# Patient Record
Sex: MALE | Race: WHITE | HISPANIC OR LATINO | Employment: FULL TIME | URBAN - METROPOLITAN AREA
[De-identification: names, ages, dates, MRNs, and addresses within clinical notes are randomized per-mention and may not be internally consistent; named-entity substitution may affect disease eponyms.]

---

## 2017-02-23 ENCOUNTER — HOSPITAL ENCOUNTER (EMERGENCY)
Facility: OTHER | Age: 32
Discharge: HOME | End: 2017-02-23
Attending: EMERGENCY MEDICINE
Payer: COMMERCIAL

## 2017-02-23 VITALS
BODY MASS INDEX: 26.6 KG/M2 | OXYGEN SATURATION: 100 % | DIASTOLIC BLOOD PRESSURE: 71 MMHG | HEIGHT: 71 IN | TEMPERATURE: 98 F | SYSTOLIC BLOOD PRESSURE: 110 MMHG | HEART RATE: 65 BPM | WEIGHT: 190 LBS | RESPIRATION RATE: 18 BRPM

## 2017-02-23 DIAGNOSIS — S00.01XA ABRASION OF SCALP, INITIAL ENCOUNTER: ICD-10-CM

## 2017-02-23 DIAGNOSIS — S00.03XA HEMATOMA OF SCALP, INITIAL ENCOUNTER: ICD-10-CM

## 2017-02-23 DIAGNOSIS — R56.9 SEIZURE: Primary | ICD-10-CM

## 2017-02-23 DIAGNOSIS — R78.89 DILANTIN LEVEL TOO LOW: ICD-10-CM
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
## 2017-02-23 LAB
ANION GAP SERPL CALC-SCNC: 7 MMOL/L
BASOPHILS # BLD AUTO: 0.02 K/UL
BASOPHILS NFR BLD: 0.2 %
BUN SERPL-MCNC: 10 MG/DL
CALCIUM SERPL-MCNC: 9.1 MG/DL
CHLORIDE SERPL-SCNC: 104 MMOL/L
CO2 SERPL-SCNC: 28 MMOL/L
CREAT SERPL-MCNC: 0.8 MG/DL
DIFFERENTIAL METHOD: ABNORMAL
EOSINOPHIL # BLD AUTO: 0.2 K/UL
EOSINOPHIL NFR BLD: 1.4 %
ERYTHROCYTE [DISTWIDTH] IN BLOOD BY AUTOMATED COUNT: 12.5 %
EST. GFR  (AFRICAN AMERICAN): >60 ML/MIN/1.73 M^2
EST. GFR  (NON AFRICAN AMERICAN): >60 ML/MIN/1.73 M^2
GLUCOSE SERPL-MCNC: 99 MG/DL
HCT VFR BLD AUTO: 47 %
HGB BLD-MCNC: 16.5 G/DL
LYMPHOCYTES # BLD AUTO: 1.5 K/UL
LYMPHOCYTES NFR BLD: 14.1 %
MAGNESIUM SERPL-MCNC: 2.3 MG/DL
MCH RBC QN AUTO: 31.4 PG
MCHC RBC AUTO-ENTMCNC: 35.1 %
MCV RBC AUTO: 89 FL
MONOCYTES # BLD AUTO: 0.8 K/UL
MONOCYTES NFR BLD: 8 %
NEUTROPHILS # BLD AUTO: 7.9 K/UL
NEUTROPHILS NFR BLD: 76 %
PHENYTOIN SERPL-MCNC: 4 UG/ML
PLATELET # BLD AUTO: 170 K/UL
PMV BLD AUTO: 11 FL
POTASSIUM SERPL-SCNC: 4.1 MMOL/L
RBC # BLD AUTO: 5.26 M/UL
SODIUM SERPL-SCNC: 139 MMOL/L
WBC # BLD AUTO: 10.41 K/UL

## 2017-02-23 PROCEDURE — 90715 TDAP VACCINE 7 YRS/> IM: CPT | Performed by: EMERGENCY MEDICINE

## 2017-02-23 PROCEDURE — 25000003 PHARM REV CODE 250: Performed by: EMERGENCY MEDICINE

## 2017-02-23 PROCEDURE — 63600175 PHARM REV CODE 636 W HCPCS: Performed by: EMERGENCY MEDICINE

## 2017-02-23 PROCEDURE — 99284 EMERGENCY DEPT VISIT MOD MDM: CPT

## 2017-02-23 PROCEDURE — 90471 IMMUNIZATION ADMIN: CPT | Performed by: EMERGENCY MEDICINE

## 2017-02-23 RX ORDER — LEVETIRACETAM 750 MG/1
750 TABLET ORAL 2 TIMES DAILY
COMMUNITY
End: 2017-02-23

## 2017-02-23 RX ORDER — PHENYTOIN SODIUM 100 MG/1
400 CAPSULE, EXTENDED RELEASE ORAL
Status: COMPLETED | OUTPATIENT
Start: 2017-02-23 | End: 2017-02-23

## 2017-02-23 RX ORDER — LAMOTRIGINE 100 MG/1
100 TABLET ORAL 2 TIMES DAILY
Qty: 30 TABLET | Refills: 0 | Status: SHIPPED | OUTPATIENT
Start: 2017-02-23 | End: 2017-02-23

## 2017-02-23 RX ORDER — LACOSAMIDE 100 MG/1
100 TABLET ORAL 2 TIMES DAILY
Qty: 30 TABLET | Refills: 3 | Status: SHIPPED | OUTPATIENT
Start: 2017-02-23 | End: 2017-02-23

## 2017-02-23 RX ORDER — LAMOTRIGINE 25 MG/1
25 TABLET ORAL EVERY 12 HOURS
COMMUNITY
End: 2017-02-23

## 2017-02-23 RX ORDER — PHENYTOIN SODIUM 100 MG/1
100 CAPSULE, EXTENDED RELEASE ORAL 4 TIMES DAILY
COMMUNITY
End: 2017-02-23

## 2017-02-23 RX ORDER — LACOSAMIDE 100 MG/1
100 TABLET ORAL 2 TIMES DAILY
Qty: 30 TABLET | Refills: 0 | Status: ON HOLD | OUTPATIENT
Start: 2017-02-23 | End: 2017-05-12 | Stop reason: SDUPTHER

## 2017-02-23 RX ORDER — PHENYTOIN SODIUM 100 MG/1
100 CAPSULE, EXTENDED RELEASE ORAL 4 TIMES DAILY
Qty: 90 CAPSULE | Refills: 0 | Status: ON HOLD | OUTPATIENT
Start: 2017-02-23 | End: 2017-05-12 | Stop reason: HOSPADM

## 2017-02-23 RX ORDER — LEVETIRACETAM 750 MG/1
750 TABLET ORAL 2 TIMES DAILY
Qty: 30 TABLET | Refills: 0 | Status: ON HOLD | OUTPATIENT
Start: 2017-02-23 | End: 2017-05-12 | Stop reason: HOSPADM

## 2017-02-23 RX ORDER — LAMOTRIGINE 100 MG/1
100 TABLET ORAL 2 TIMES DAILY
Qty: 60 TABLET | Refills: 0 | Status: ON HOLD | OUTPATIENT
Start: 2017-02-23 | End: 2017-05-09 | Stop reason: SDUPTHER

## 2017-02-23 RX ADMIN — CLOSTRIDIUM TETANI TOXOID ANTIGEN (FORMALDEHYDE INACTIVATED), CORYNEBACTERIUM DIPHTHERIAE TOXOID ANTIGEN (FORMALDEHYDE INACTIVATED), BORDETELLA PERTUSSIS TOXOID ANTIGEN (GLUTARALDEHYDE INACTIVATED), BORDETELLA PERTUSSIS FILAMENTOUS HEMAGGLUTININ ANTIGEN (FORMALDEHYDE INACTIVATED), BORDETELLA PERTUSSIS PERTACTIN ANTIGEN, AND BORDETELLA PERTUSSIS FIMBRIAE 2/3 ANTIGEN 0.5 ML: 5; 2; 2.5; 5; 3; 5 INJECTION, SUSPENSION INTRAMUSCULAR at 03:02

## 2017-02-23 RX ADMIN — PHENYTOIN SODIUM 400 MG: 100 CAPSULE, EXTENDED RELEASE ORAL at 04:02

## 2017-02-23 NOTE — DISCHARGE INSTRUCTIONS
YOUr dilantin level is low. We will give it here in the ER, but you will need to then take 300mg 2 hours after 1st dose, and then another 300mg 2 hours after that 2nd dose.     Bruises (Contusions)    A contusion is a bruise. A bruise happens when a blow to your body doesn't break the skin but does break blood vessels beneath the skin. Blood leaking from the broken vessels causes redness and swelling. As it heals, your bruise is likely to turn colors like purple, green, and yellow. This is normal. The bruise should fade in 2 or 3 weeks.  Factors that make you more likely to bruise  Almost everyone bruises now and then. Certain people do bruise more easily than others. You're more prone to bruising as you get older. That's because blood vessels become more fragile with age. You're also more likely to bruise if you have a clotting disorder such as hemophilia or take medications that reduce clotting, including aspirin.  When to go to the emergency room (ER)  Bruises almost always heal on their own without special treatment. But for some people, a bad bruise can be serious. Seek medical care if you:  · Have a clotting disorder such as hemophilia.  · Have cirrhosis or other serious liver disease.  · Take blood-thinning medications such as warfarin (Coumadin).  What to expect in the ER  A doctor will examine your bruise and ask about any health conditions you have. In some cases, you may have a test to check how well your blood clots. Other treatment will depend on your needs.  Follow-up care  Sometimes a bruise gets worse instead of better. It may become larger and more swollen. This can occur when your body walls off a small pool of blood under the skin (hematoma). In very rare cases, your doctor may need to drain excess blood from the area.  Tip:  Apply an ice pack or bag of frozen peas to a bruise (keep a thin cloth between the cold source and your skin). This can help reduce redness and swelling.   Date Last  Reviewed: 11/30/2014  © 5877-6909 [employer]. [address] [place] [address], [place] [address]. All rights reserved. This information is not intended as a substitute for professional medical care. Always follow your healthcare professional's instructions.          Abrasions  Abrasions are skin scrapes. Their treatment depends on how large and deep the abrasion is.  Home care  You may be prescribed an antibiotic cream or ointment to apply to the wound. This helps prevent infection. Follow instructions when using this medication.  General care  · To care for the abrasion, do the following each day for as long as directed by your health care provider.  ¨ If you were given a bandage, change it once a day. If your bandage sticks to the wound, soak it in warm water until it loosens.  ¨ Wash the area with soap and warm water. You may do this in a sink or under a tub faucet or shower. Rinse off the soap. Then pat the area dry with a clean towel.  ¨ If antibiotic ointment or cream was prescribed, reapply it to the wound as directed. Cover the wound with a fresh non-stick bandage. If the bandage becomes wet or dirty, change it as soon as possible.  · You may use acetaminophen or ibuprofen to control pain unless another pain medication was prescribed. Note: If you have chronic liver or kidney disease or ever had a stomach ulcer or GI bleeding, talk with your health care provider before using these medications. Do not use ibuprofen in children under six months of age.  · Most skin wounds heal within ten days. But an infection may occur despite treatment. Therefore, monitor the wound for signs of infection as listed below.  Follow-up care  Follow up with your health care provider, or as advised.  When to seek medical advice  Call your health care provider right away if any of these occur:  · Fever of 101ºF (38.3ºC) or higher, or as directed by your health care provider  · Increasing pain, redness, swelling, or drainage  from the wound  · Bleeding from the wound that does not stop after a few minutes of steady, firm pressure  · Decreased ability to move any body part near wound  Date Last Reviewed: 3/22/2015  © 8481-5549 [employer]. [address] [place] [address], [place] [address]. All rights reserved. This information is not intended as a substitute for professional medical care. Always follow your healthcare professional's instructions.

## 2017-02-23 NOTE — ED TRIAGE NOTES
Per patient he had a seizure must have fallen out of bed.  Has contusion and abrasions to head.  Pt states he thinks he happened around 12:30.  Patient is awake and alert pt denies any vomiting no other complaints patient is neurologically intact.  RR easy non labored NAD  Family at bedside.  No witness to seizure.  States the last seizure he had was in December.

## 2017-02-23 NOTE — ED PROVIDER NOTES
Encounter Date: 2/23/2017    SCRIBE #1 NOTE: I, Lisa Groves, am scribing for, and in the presence of, Dr. Patel.       History     Chief Complaint   Patient presents with    Fall     pt repors having a seizure this morning, fell off bed, hitting left side of forehead; abrasion and hematoma noted to left forehead     Review of patient's allergies indicates:  No Known Allergies  HPI Comments: Time seen by provider: 2:49 PM    This is a 31 y.o. male who presents with complaint of head trauma after an apparent seizure prior to arrival. He states he fell asleep in his bed on the second floor of his house at 12:15pm, and he woke on the couch on the first floor of his house at 1PM. He complains of an abrasion to the left side of his forehead and tongue pain, and he believes he hit his head on the floor and bit his tongue. He did not see vomit in the house. He did not take them this morning as prescribed, but he took them after the seizure. His last seizure was 12/24/16, and he usually has them twice a year. He does not know the date of his last tetanus vaccination.     The history is provided by the patient and the spouse.     Past Medical History:   Diagnosis Date    Seizures      Past Surgical History:   Procedure Laterality Date    SHOULDER SURGERY       Family History   Problem Relation Age of Onset    Heart disease Father     Diabetes Maternal Grandfather      Social History   Substance Use Topics    Smoking status: Former Smoker     Packs/day: 0.10     Types: Cigarettes    Smokeless tobacco: None    Alcohol use 0.5 oz/week     1 Standard drinks or equivalent per week     Review of Systems   Constitutional: Negative for chills and fever.   HENT:        Positive for tongue pain.   Positive for abrasion to the left side of the forehead.    Respiratory: Negative for chest tightness and shortness of breath.    Cardiovascular: Negative for chest pain.   Gastrointestinal: Negative for abdominal pain, nausea and  vomiting.   Endocrine: Negative for polyuria.   Genitourinary: Negative for dysuria.   Musculoskeletal: Negative for myalgias.   Skin: Negative for rash.   Neurological: Positive for seizures.       Physical Exam   Initial Vitals   BP Pulse Resp Temp SpO2   02/23/17 1425 02/23/17 1425 02/23/17 1425 02/23/17 1425 02/23/17 1425   126/69 73 18 98.2 °F (36.8 °C) 98 %     Physical Exam    Nursing note and vitals reviewed.  Constitutional: He appears well-developed and well-nourished. Airway: Normal. Breathing: Normal. Circulation: Normal. No distress.   HENT:   Head: Normocephalic. Head is with abrasion and with contusion.       Right Ear: External ear normal. No hemotympanum.   Left Ear: External ear normal. No hemotympanum.   Nose: Nose normal.   Mouth/Throat: Oropharynx is clear and moist.   Obvious head trauma. 10-cm x 5-cm region of ecchymosis with hematoma to the left temporal region. No crepitance.   No hemotympanum.   Small tongue laceration to the left aspect. No malocclusion.    Eyes: Pupils: Normal pupils. Conjunctivae and EOM are normal. Pupils are equal, round, and reactive to light.   Neck: Normal range of motion. Neck supple.   Cardiovascular: Normal rate and normal heart sounds.   Pulmonary/Chest: Effort normal and breath sounds normal.   Abdominal: Soft. Normal appearance and bowel sounds are normal. There is no tenderness.   Musculoskeletal: Normal range of motion.   Neurological: He is alert and oriented to person, place, and time. He has normal strength. No cranial nerve deficit or sensory deficit.   Pt has normal speech, CN II-XII intact, no facial asymmetry, no pronator drift, nml strength upper and lower extremities, nml gait, negative Rhomberg.    Skin: Skin is warm and dry.   Psychiatric: He has a normal mood and affect. His behavior is normal. Thought content normal.         ED Course   Procedures  Labs Reviewed   CBC W/ AUTO DIFFERENTIAL - Abnormal; Notable for the following:        Result  Value    MCH 31.4 (*)     Gran # 7.9 (*)     Gran% 76.0 (*)     Lymph% 14.1 (*)     All other components within normal limits   BASIC METABOLIC PANEL - Abnormal; Notable for the following:     Anion Gap 7 (*)     All other components within normal limits   PHENYTOIN LEVEL, TOTAL - Abnormal; Notable for the following:     Phenytoin Lvl 4.0 (*)     All other components within normal limits   MAGNESIUM     Imaging Results         CT Head Without Contrast (Final result) Result time:  02/23/17 15:40:04    Final result by Ger Cueto MD (02/23/17 15:40:04)    Impression:         No acute intracranial findings.      Electronically signed by: Ger Cueto MD  Date:     02/23/17  Time:    15:40     Narrative:    Comparison: None.    Findings:Routine CT head protocol performed without IV contrast. No extra-axial fluid collections or acute intracranial hemorrhage. The ventricles and sulci are within normal limits. No mass effect identified. Visualized portions of paranasal sinuses and mastoid air cells are clear.                     Medical Decision Making:   Initial Assessment:   Urgent evaluation of 31-year-old gentleman with history of seizure disorder, unknown etiology presenting after a presumed seizure and head injury.  Patient did miss his morning dose of antiepileptic medications this morning, he went to bed at 3 AM, patient then awoke and different position, blood noted to forehead.  Wife endorsing the patient was mildly confused initially.  Patient denies recent illness, usually has 2-3 seizures per year.  On exam patient has a large abrasion to the left temple with ecchymosis, otherwise no obvious injuries.  Neurologic exam intact, with normal mental status.  We'll obtain head CT, administer tetanus, electrolyte levels, and Keppra levels, as well as other medications that do not result in the ER.  Clinical Tests:   Lab Tests: Ordered and Reviewed  Radiological Study: Ordered and Reviewed  ED Management:  Patient  observed in the emergency Department with no repeat seizure activity.  Labs notable for subtherapeutic Dilantin level, otherwise no gross metabolic disturbances.  Patient given Dilantin load, with instructions for completion with 2 additional doses 2 hours apart.  Patient was requesting additional prescription for his medications, appears to be unclear on appropriate administration.  Discussed with patient strict follow-up with neurology as soon as possible for clarification.            Scribe Attestation:   Scribe #1: I performed the above scribed service and the documentation accurately describes the services I performed. I attest to the accuracy of the note.    Attending Attestation:           Physician Attestation for Scribe:  Physician Attestation Statement for Scribe #1: I, Dr. Patel, reviewed documentation, as scribed by Lisa Groves in my presence, and it is both accurate and complete.                 ED Course     Clinical Impression:     1. Seizure    2. Fall    3. Hematoma of scalp, initial encounter    4. Abrasion of scalp, initial encounter    5. Dilantin level too low       Disposition:   Disposition: Discharged  Condition: Stable       Kacey Patel MD  02/23/17 5631

## 2017-02-23 NOTE — ED AVS SNAPSHOT
[facility]  [address]  [facility] [address]               Alexx Stephan   2017  2:37 PM   ED    Description:  Male : 1985   Department:  [facility]           Your Care was Coordinated By:     Provider Role From To    Kacey Patel MD Attending Provider 17 [id number] --      Reason for Visit     Fall           Diagnoses this Visit        Comments    Seizure    -  Primary     Fall         Hematoma of scalp, initial encounter         Abrasion of scalp, initial encounter         Dilantin level too low           ED Disposition     ED Disposition Condition Comment    Discharge             To Do List           Follow-up Information     Follow up with [facility] - Neurology. Schedule an appointment as soon as possible for a visit in 2 days.    Specialty:  Neurology    Contact information:    [address]  [facility] [address] [phone]    Additional information:    7th Floor - [facility]       These Medications        Disp Refills Start End    levetiracetam (KEPPRA) 750 MG Tab 30 tablet 0 2017     Take 1 tablet (750 mg total) by mouth 2 (two) times daily. - Oral    Pharmacy: [facility]/pharmacy #[id number] - [place] - [address] Ph #: [phone]       phenytoin (DILANTIN) 100 MG ER capsule 90 capsule 0 2017     Take 1 capsule (100 mg total) by mouth 4 (four) times daily. - Oral    Pharmacy: [facility]/pharmacy #[id number] - [place] - [address] Ph #: [phone]       lacosamide 100 mg Tab 30 tablet 0 2017     Take 1 tablet (100 mg total) by mouth 2 (two) times daily. - Oral    Pharmacy: [facility]/pharmacy #[id number] - [place] - [address] Ph #: [phone]         [facility]     [facility] Care [facility] -  Assistance  Registered nurses in the [facility] provide clinical advisement, health education, appointment booking, and other advisory  services.  Call for this free service at [phone].             Medications           Message regarding Medications     Verify the changes and/or additions to your medication regime listed below are the same as discussed with your clinician today.  If any of these changes or additions are incorrect, please notify your healthcare provider.        START taking these NEW medications        Refills    levetiracetam (KEPPRA) 750 MG Tab 0    Sig: Take 1 tablet (750 mg total) by mouth 2 (two) times daily.    Class: Print    Route: Oral    phenytoin (DILANTIN) 100 MG ER capsule 0    Sig: Take 1 capsule (100 mg total) by mouth 4 (four) times daily.    Class: Print    Route: Oral      These medications were administered today        Dose Freq    neomycin-bacitracnZn-polymyxnB packet 1 each 1 packet ED 1 Time    Sig: Apply 1 each topically ED 1 Time.    Class: Normal    Route: Topical (Top)    Tdap vaccine injection 0.5 mL 0.5 mL Once    Sig: Inject 0.5 mLs into the muscle once.    Class: Normal    Route: Intramuscular    phenytoin (DILANTIN) ER capsule 400 mg 400 mg ED 1 Time    Sig: Take 4 capsules (400 mg total) by mouth ED 1 Time.    Class: Normal    Route: Oral      CHANGE how you are taking these medications     Start Taking Instead of    lacosamide 100 mg Tab lacosamide 100 mg Tab    Dosage:  Take 1 tablet (100 mg total) by mouth 2 (two) times daily. Dosage:  Take 1 tablet by mouth 2 (two) times daily.           Verify that the below list of medications is an accurate representation of the medications you are currently taking.  If none reported, the list may be blank. If incorrect, please contact your healthcare provider. Carry this list with you in case of emergency.           Current Medications     lamotrigine (LAMICTAL) 100 MG tablet Take 100 mg by mouth every 12 (twelve) hours.     lamotrigine (LAMICTAL) 25 MG tablet Take 25 mg by mouth every 12 (twelve) hours.    lacosamide 100 mg Tab Take 1 tablet (100 mg  "total) by mouth 2 (two) times daily.    levetiracetam (KEPPRA) 750 MG Tab Take 1 tablet (750 mg total) by mouth 2 (two) times daily.    neomycin-bacitracnZn-polymyxnB packet 1 each Apply 1 each topically ED 1 Time.    phenytoin (DILANTIN) 100 MG ER capsule Take 1 capsule (100 mg total) by mouth 4 (four) times daily.    phenytoin (DILANTIN) ER capsule 400 mg Take 4 capsules (400 mg total) by mouth ED 1 Time.           Clinical Reference Information           Your Vitals Were     BP Pulse Temp Resp Height Weight    117/63 64 98.2 °F (36.8 °C) (Oral) 18 5' 11" (1.803 m) 86.2 kg (190 lb)    SpO2 BMI             96% 26.5 kg/m2         Allergies as of 2/23/2017     No Known Allergies      Immunizations Administered on Date of Encounter - 2/23/2017     Name Date Dose VIS Date Route    TDAP 2/23/2017 0.5 mL 2/24/2015 Intramuscular      ED Micro, Lab, POCT     Start Ordered       Status Ordering Provider    02/23/17 1510 02/23/17 1513  CBC auto differential  STAT      Final result     02/23/17 1510 02/23/17 1513  Basic metabolic panel  STAT      Final result     02/23/17 1510 02/23/17 1513  Magnesium  STAT      Final result     02/23/17 1510 02/23/17 1513  Phenytoin level, total  Once      Final result       ED Imaging Orders     Start Ordered       Status Ordering Provider    02/23/17 1514 02/23/17 1513  CT Head Without Contrast  1 time imaging      Final result         Discharge Instructions         YOUr dilantin level is low. We will give it here in the ER, but you will need to then take 300mg 2 hours after 1st dose, and then another 300mg 2 hours after that 2nd dose.     Bruises (Contusions)    A contusion is a bruise. A bruise happens when a blow to your body doesn't break the skin but does break blood vessels beneath the skin. Blood leaking from the broken vessels causes redness and swelling. As it heals, your bruise is likely to turn colors like purple, green, and yellow. This is normal. The bruise should fade in 2 or " 3 weeks.  Factors that make you more likely to bruise  Almost everyone bruises now and then. Certain people do bruise more easily than others. You're more prone to bruising as you get older. That's because blood vessels become more fragile with age. You're also more likely to bruise if you have a clotting disorder such as hemophilia or take medications that reduce clotting, including aspirin.  When to go to the emergency room (ER)  Bruises almost always heal on their own without special treatment. But for some people, a bad bruise can be serious. Seek medical care if you:  · Have a clotting disorder such as hemophilia.  · Have cirrhosis or other serious liver disease.  · Take blood-thinning medications such as warfarin (Coumadin).  What to expect in the ER  A doctor will examine your bruise and ask about any health conditions you have. In some cases, you may have a test to check how well your blood clots. Other treatment will depend on your needs.  Follow-up care  Sometimes a bruise gets worse instead of better. It may become larger and more swollen. This can occur when your body walls off a small pool of blood under the skin (hematoma). In very rare cases, your doctor may need to drain excess blood from the area.  Tip:  Apply an ice pack or bag of frozen peas to a bruise (keep a thin cloth between the cold source and your skin). This can help reduce redness and swelling.   Date Last Reviewed: 11/30/2014  © 7921-9411 [employer]. [address] [place] [address], [place] [address]. All rights reserved. This information is not intended as a substitute for professional medical care. Always follow your healthcare professional's instructions.          Abrasions  Abrasions are skin scrapes. Their treatment depends on how large and deep the abrasion is.  Home care  You may be prescribed an antibiotic cream or ointment to apply to the wound. This helps prevent infection. Follow instructions when using this  medication.  General care  · To care for the abrasion, do the following each day for as long as directed by your health care provider.  ¨ If you were given a bandage, change it once a day. If your bandage sticks to the wound, soak it in warm water until it loosens.  ¨ Wash the area with soap and warm water. You may do this in a sink or under a tub faucet or shower. Rinse off the soap. Then pat the area dry with a clean towel.  ¨ If antibiotic ointment or cream was prescribed, reapply it to the wound as directed. Cover the wound with a fresh non-stick bandage. If the bandage becomes wet or dirty, change it as soon as possible.  · You may use acetaminophen or ibuprofen to control pain unless another pain medication was prescribed. Note: If you have chronic liver or kidney disease or ever had a stomach ulcer or GI bleeding, talk with your health care provider before using these medications. Do not use ibuprofen in children under six months of age.  · Most skin wounds heal within ten days. But an infection may occur despite treatment. Therefore, monitor the wound for signs of infection as listed below.  Follow-up care  Follow up with your health care provider, or as advised.  When to seek medical advice  Call your health care provider right away if any of these occur:  · Fever of 101ºF (38.3ºC) or higher, or as directed by your health care provider  · Increasing pain, redness, swelling, or drainage from the wound  · Bleeding from the wound that does not stop after a few minutes of steady, firm pressure  · Decreased ability to move any body part near wound  Date Last Reviewed: 3/22/2015  © 6004-2469 [employer]. [address] [place] [address]. All rights reserved. This information is not intended as a substitute for professional medical care. Always follow your healthcare professional's instructions.          Discharge References/Attachments     HEAD INJURY (ADULT) (ENGLISH)    SEIZURE, RECURRENT  (ADULT) (ENGLISH)      [facility] Sign-Up     Activating your [facility] account is as easy as 1-2-3!     1) Visit [email or web address], select Sign Up Now, enter this activation code and your date of birth, then select Next.  [email or web address]  Expires: 4/9/2017  4:43 PM      2) Create a username and password to use when you visit [facility] in the future and select a security question in case you lose your password and select Next.    3) Enter your e-mail address and click Sign Up!    Additional Information  If you have questions, please e-mail [email or web address] or call [phone] to talk to our [facility] staff. Remember, [facility] is NOT to be used for urgent needs. For medical emergencies, dial 911.         Smoking Cessation     If you would like to quit smoking:   You may be eligible for free services if you are a [place] resident and started smoking cigarettes before September 1, 1988.  Call the Smoking Cessation Trust ([facility]) toll free at (983) 677-7721 or (596) 812-7824.   Call 4-612-QUIT-NOW if you do not meet the above criteria.             [facility] complies with applicable Federal civil rights laws and does not discriminate on the basis of race, color, national origin, age, disability, or sex.        Language Assistance Services     ATTENTION: Language assistance services are available, free of charge. Please call [phone].      ATENCIÓN: Si habla español, tiene a garcia disposición servicios gratuitos de asistencia lingüística. Llame al [phone].     CHÚ Ý: N?u b?n nói Ti?ng Vi?t, có các d?ch v? h? tr? ngôn ng? mi?n phí dành cho b?n. G?i s? [phone].

## 2017-04-20 ENCOUNTER — OFFICE VISIT (OUTPATIENT)
Dept: NEUROLOGY | Facility: CLINIC | Age: 32
End: 2017-04-20
Payer: COMMERCIAL

## 2017-04-20 VITALS
SYSTOLIC BLOOD PRESSURE: 105 MMHG | BODY MASS INDEX: 26.42 KG/M2 | HEIGHT: 71 IN | HEART RATE: 66 BPM | WEIGHT: 188.69 LBS | DIASTOLIC BLOOD PRESSURE: 61 MMHG

## 2017-04-20 DIAGNOSIS — G40.211 PARTIAL SYMPTOMATIC EPILEPSY WITH COMPLEX PARTIAL SEIZURES, INTRACTABLE, WITH STATUS EPILEPTICUS: ICD-10-CM

## 2017-04-20 DIAGNOSIS — G40.209 COMPLEX PARTIAL SEIZURES EVOLVING TO GENERALIZED TONIC-CLONIC SEIZURES: Primary | ICD-10-CM

## 2017-04-20 PROCEDURE — 99999 PR PBB SHADOW E&M-EST. PATIENT-LVL III: CPT | Mod: PBBFAC,,, | Performed by: PSYCHIATRY & NEUROLOGY

## 2017-04-20 PROCEDURE — 1160F RVW MEDS BY RX/DR IN RCRD: CPT | Mod: S$GLB,,, | Performed by: PSYCHIATRY & NEUROLOGY

## 2017-04-20 PROCEDURE — 99205 OFFICE O/P NEW HI 60 MIN: CPT | Mod: S$GLB,,, | Performed by: PSYCHIATRY & NEUROLOGY

## 2017-04-20 RX ORDER — LACOSAMIDE 200 MG/1
200 TABLET, FILM COATED ORAL 2 TIMES DAILY
COMMUNITY
Start: 2017-03-31

## 2017-04-20 RX ORDER — LAMOTRIGINE 25 MG/1
25 TABLET ORAL
Status: ON HOLD | COMMUNITY
Start: 2017-03-30 | End: 2017-05-12
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
## 2017-04-27 DIAGNOSIS — G40.219 PARTIAL SYMPTOMATIC EPILEPSY WITH COMPLEX PARTIAL SEIZURES, INTRACTABLE, WITHOUT STATUS EPILEPTICUS: Primary | ICD-10-CM

## 2017-05-01 RX ORDER — LAMOTRIGINE 100 MG/1
100 TABLET ORAL 2 TIMES DAILY
Qty: 60 TABLET | Refills: 2 | OUTPATIENT
Start: 2017-05-01 | End: 2018-05-01

## 2017-05-09 ENCOUNTER — HOSPITAL ENCOUNTER (INPATIENT)
Facility: HOSPITAL | Age: 32
LOS: 3 days | Discharge: HOME | DRG: 101 | End: 2017-05-12
Attending: HOSPITALIST | Admitting: HOSPITALIST
Payer: COMMERCIAL

## 2017-05-09 DIAGNOSIS — G40.219 LOCALIZATION-RELATED EPILEPSY WITH COMPLEX PARTIAL SEIZURES WITH INTRACTABLE EPILEPSY: Primary | ICD-10-CM

## 2017-05-09 DIAGNOSIS — R56.9 CONVULSIONS: ICD-10-CM

## 2017-05-09 DIAGNOSIS — R94.31 ST ELEVATION: ICD-10-CM

## 2017-05-09 DIAGNOSIS — G40.219 PARTIAL SYMPTOMATIC EPILEPSY WITH COMPLEX PARTIAL SEIZURES, INTRACTABLE, WITHOUT STATUS EPILEPTICUS: ICD-10-CM

## 2017-05-09 LAB
ALBUMIN SERPL BCP-MCNC: 4.2 G/DL
ALP SERPL-CCNC: 84 U/L
ALT SERPL W/O P-5'-P-CCNC: 31 U/L
ANION GAP SERPL CALC-SCNC: 11 MMOL/L
AST SERPL-CCNC: 26 U/L
BASOPHILS # BLD AUTO: 0.04 K/UL
BASOPHILS NFR BLD: 0.5 %
BILIRUB SERPL-MCNC: 0.3 MG/DL
BUN SERPL-MCNC: 13 MG/DL
CALCIUM SERPL-MCNC: 9.2 MG/DL
CHLORIDE SERPL-SCNC: 104 MMOL/L
CO2 SERPL-SCNC: 26 MMOL/L
CREAT SERPL-MCNC: 1 MG/DL
DIFFERENTIAL METHOD: ABNORMAL
EOSINOPHIL # BLD AUTO: 0.5 K/UL
EOSINOPHIL NFR BLD: 6.6 %
ERYTHROCYTE [DISTWIDTH] IN BLOOD BY AUTOMATED COUNT: 12.3 %
EST. GFR  (AFRICAN AMERICAN): >60 ML/MIN/1.73 M^2
EST. GFR  (NON AFRICAN AMERICAN): >60 ML/MIN/1.73 M^2
GLUCOSE SERPL-MCNC: 96 MG/DL
HCT VFR BLD AUTO: 47.3 %
HGB BLD-MCNC: 16.2 G/DL
LYMPHOCYTES # BLD AUTO: 2.3 K/UL
LYMPHOCYTES NFR BLD: 29.5 %
MAGNESIUM SERPL-MCNC: 2.3 MG/DL
MCH RBC QN AUTO: 31.2 PG
MCHC RBC AUTO-ENTMCNC: 34.2 %
MCV RBC AUTO: 91 FL
MONOCYTES # BLD AUTO: 0.5 K/UL
MONOCYTES NFR BLD: 6.7 %
NEUTROPHILS # BLD AUTO: 4.4 K/UL
NEUTROPHILS NFR BLD: 56.6 %
PHOSPHATE SERPL-MCNC: 3.2 MG/DL
PLATELET # BLD AUTO: 158 K/UL
PMV BLD AUTO: 11.5 FL
POTASSIUM SERPL-SCNC: 3.7 MMOL/L
PROT SERPL-MCNC: 7.4 G/DL
RBC # BLD AUTO: 5.2 M/UL
SODIUM SERPL-SCNC: 141 MMOL/L
TSH SERPL DL<=0.005 MIU/L-ACNC: 2.6 UIU/ML
WBC # BLD AUTO: 7.72 K/UL

## 2017-05-09 PROCEDURE — 83735 ASSAY OF MAGNESIUM: CPT

## 2017-05-09 PROCEDURE — 36415 COLL VENOUS BLD VENIPUNCTURE: CPT

## 2017-05-09 PROCEDURE — 99222 1ST HOSP IP/OBS MODERATE 55: CPT | Mod: ,,, | Performed by: PHYSICIAN ASSISTANT

## 2017-05-09 PROCEDURE — 25000003 PHARM REV CODE 250: Performed by: PHYSICIAN ASSISTANT

## 2017-05-09 PROCEDURE — 93005 ELECTROCARDIOGRAM TRACING: CPT

## 2017-05-09 PROCEDURE — 80053 COMPREHEN METABOLIC PANEL: CPT

## 2017-05-09 PROCEDURE — 84100 ASSAY OF PHOSPHORUS: CPT

## 2017-05-09 PROCEDURE — 84443 ASSAY THYROID STIM HORMONE: CPT

## 2017-05-09 PROCEDURE — 20600001 HC STEP DOWN PRIVATE ROOM

## 2017-05-09 PROCEDURE — 95957 EEG DIGITAL ANALYSIS: CPT

## 2017-05-09 PROCEDURE — 93010 ELECTROCARDIOGRAM REPORT: CPT | Mod: 76,,, | Performed by: INTERNAL MEDICINE

## 2017-05-09 PROCEDURE — 85025 COMPLETE CBC W/AUTO DIFF WBC: CPT

## 2017-05-09 PROCEDURE — 95951 HC EEG MONITORING/VIDEO RECORD: CPT

## 2017-05-09 RX ORDER — PHENYTOIN SODIUM 100 MG/1
100 CAPSULE, EXTENDED RELEASE ORAL 2 TIMES DAILY
Status: DISCONTINUED | OUTPATIENT
Start: 2017-05-09 | End: 2017-05-10

## 2017-05-09 RX ORDER — SODIUM CHLORIDE 0.9 % (FLUSH) 0.9 %
3 SYRINGE (ML) INJECTION EVERY 8 HOURS
Status: DISCONTINUED | OUTPATIENT
Start: 2017-05-09 | End: 2017-05-12 | Stop reason: HOSPADM

## 2017-05-09 RX ORDER — LACOSAMIDE 100 MG/1
100 TABLET ORAL 2 TIMES DAILY
Status: DISCONTINUED | OUTPATIENT
Start: 2017-05-09 | End: 2017-05-09

## 2017-05-09 RX ORDER — LAMOTRIGINE 25 MG/1
25 TABLET ORAL 2 TIMES DAILY
Status: DISCONTINUED | OUTPATIENT
Start: 2017-05-09 | End: 2017-05-12 | Stop reason: HOSPADM

## 2017-05-09 RX ORDER — DOCUSATE SODIUM 100 MG/1
100 CAPSULE, LIQUID FILLED ORAL 2 TIMES DAILY PRN
Status: DISCONTINUED | OUTPATIENT
Start: 2017-05-09 | End: 2017-05-12 | Stop reason: HOSPADM

## 2017-05-09 RX ORDER — ACETAMINOPHEN 325 MG/1
650 TABLET ORAL EVERY 6 HOURS PRN
Status: DISCONTINUED | OUTPATIENT
Start: 2017-05-09 | End: 2017-05-12 | Stop reason: HOSPADM

## 2017-05-09 RX ORDER — LAMOTRIGINE 100 MG/1
100 TABLET ORAL 2 TIMES DAILY
Status: DISCONTINUED | OUTPATIENT
Start: 2017-05-09 | End: 2017-05-12 | Stop reason: HOSPADM

## 2017-05-09 RX ORDER — ONDANSETRON 2 MG/ML
4 INJECTION INTRAMUSCULAR; INTRAVENOUS EVERY 8 HOURS PRN
Status: DISCONTINUED | OUTPATIENT
Start: 2017-05-09 | End: 2017-05-12 | Stop reason: HOSPADM

## 2017-05-09 RX ORDER — LACOSAMIDE 100 MG/1
200 TABLET ORAL 2 TIMES DAILY
Status: DISCONTINUED | OUTPATIENT
Start: 2017-05-09 | End: 2017-05-12 | Stop reason: HOSPADM

## 2017-05-09 RX ORDER — ONDANSETRON 8 MG/1
8 TABLET, ORALLY DISINTEGRATING ORAL EVERY 8 HOURS PRN
Status: DISCONTINUED | OUTPATIENT
Start: 2017-05-09 | End: 2017-05-12 | Stop reason: HOSPADM

## 2017-05-09 RX ADMIN — LAMOTRIGINE 100 MG: 100 TABLET ORAL at 10:05

## 2017-05-09 RX ADMIN — PHENYTOIN SODIUM 100 MG: 100 CAPSULE ORAL at 10:05

## 2017-05-09 RX ADMIN — LACOSAMIDE 200 MG: 100 TABLET, FILM COATED ORAL at 10:05

## 2017-05-09 RX ADMIN — LAMOTRIGINE 25 MG: 100 TABLET ORAL at 10:05

## 2017-05-09 RX ADMIN — SODIUM CHLORIDE, PRESERVATIVE FREE 3 ML: 5 INJECTION INTRAVENOUS at 10:05

## 2017-05-09 NOTE — IP AVS SNAPSHOT
[facility]  [address]  [facility] [address]  Phone: [phone]           Patient Discharge Instructions   Our goal is to set you up for success. This packet includes information on your condition, medications, and your home care.  It will help you care for yourself to prevent having to return to the hospital.     Please ask your nurse if you have any questions.      There are many details to remember when preparing to leave the hospital. Here is what you will need to do:    1. Take your medicine. If you are prescribed medications, review your Medication List on the following pages. You may have new medications to  at the pharmacy and others that you'll need to stop taking. Review the instructions for how and when to take your medications. Talk with your doctor or nurses if you are unsure of what to do.     2. Go to your follow-up appointments. Specific follow-up information is listed in the following pages. Your may be contacted by a nurse or clinical provider about future appointments. Be sure we have all of the phone numbers to reach you. Please contact your provider's office if you are unable to make an appointment.     3. Watch for warning signs. Your doctor or nurse will give you detailed warning signs to watch for and when to call for assistance. These instructions may also include educational information about your condition. If you experience any of warning signs to your health, call your doctor.           [facility]  Unless otherwise directed by your provider, please   contact [facility], our nurse care line   that is available for 24/7 assistance.     [phone] (toll-free)     Registered nurses in the [facility]   provide: appointment scheduling, clinical advisement, health education, and other advisory services.                  ** Verify the list of medication(s) below is accurate and up to date. Carry this with you in case of  emergency. If your medications have changed, please notify your healthcare provider.             Medication List      CHANGE how you take these medications        Additional Info                      * lamotrigine 100 MG tablet   Commonly known as:  LAMICTAL   Refills:  0   Dose:  100 mg   What changed:  Another medication with the same name was changed. Make sure you understand how and when to take each.    Last time this was given:  25 mg on 5/12/2017 10:03 AM   Instructions:  Take 100 mg by mouth 2 (two) times daily.     Begin Date    AM    Noon    PM    Bedtime       * lamotrigine 25 MG tablet   Commonly known as:  LAMICTAL   Refills:  0   Dose:  25 mg   What changed:    - how to take this  - when to take this    Last time this was given:  25 mg on 5/12/2017 10:03 AM   Instructions:  Take 1 tablet (25 mg total) by mouth 2 (two) times daily.     Begin Date    AM    Noon    PM    Bedtime       levetiracetam 1000 MG tablet   Commonly known as:  KEPPRA   Quantity:  60 tablet   Refills:  11   Dose:  1000 mg   What changed:    - medication strength  - how much to take    Last time this was given:  1,000 mg on 5/12/2017 10:02 AM   Instructions:  Take 1 tablet (1,000 mg total) by mouth 2 (two) times daily.     Begin Date    AM    Noon    PM    Bedtime       * Notice:  This list has 2 medication(s) that are the same as other medications prescribed for you. Read the directions carefully, and ask your doctor or other care provider to review them with you.      CONTINUE taking these medications        Additional Info                      VIMPAT 200 mg Tab   Refills:  0   Dose:  200 mg   Generic drug:  lacosamide    Last time this was given:  200 mg on 5/12/2017 10:02 AM   Instructions:  Take 200 mg by mouth 2 (two) times daily.     Begin Date    AM    Noon    PM    Bedtime         STOP taking these medications     phenytoin 100 MG ER capsule   Commonly known as:  DILANTIN            Where to Get Your Medications      These  medications were sent to [facility]pharm[facility] #[id number] - [place], [place] - [address]  [address] [facility] [address]     Phone:  [phone]     levetiracetam 1000 MG tablet         Information about where to get these medications is not yet available     ! Ask your nurse or doctor about these medications     lamotrigine 25 MG tablet                  Please bring to all follow up appointments:    1. A copy of your discharge instructions.  2. All medicines you are currently taking in their original bottles.  3. Identification and insurance card.    Please arrive 15 minutes ahead of scheduled appointment time.    Please call 24 hours in advance if you must reschedule your appointment and/or time.        Your Scheduled Appointments     May 12, 2017  1:30 PM CDT   Pet CT Whole Body with [facility] PET CT LIMIT 500 LBS   [facility]    [address] [facility] [address] [phone]              Follow-up Information     Follow up with Jammie Mcintyre MD.    Specialty:  Neurology    Why:  Follow up as scheduled    Contact information:    [address]  [id number]  [facility] [address]  [phone]          Discharge Instructions     Future Orders    Activity as tolerated     Call MD for:  increased confusion or weakness     Call MD for:  persistent dizziness, light-headedness, or visual disturbances     Call MD for:  persistent nausea and vomiting or diarrhea     Call MD for:  severe persistent headache     Call MD for:  severe uncontrolled pain     Call MD for:  temperature >100.4     Call MD for:  worsening rash     Diet general     Comments:    NPO on discharge until PET scan complete then resume regular diet    Questions:    Total calories:      Fat restriction, if any:      Protein restriction, if any:      Na restriction, if any:      Fluid restriction:      Additional restrictions:          Primary Diagnosis     Your primary diagnosis was:   "Epileptic Seizure      Admission Information     Date & Time Provider Department CSN    5/9/2017  2:13 PM Buddy Chua MD [facility] [id number]      Care Providers     Provider Role Specialty Primary office phone    Buddy Chua MD Attending Provider Hospitalist [phone]    Torey Gagnon MD Team Attending  Hospitalist [phone]    Buddy Chua MD Team Attending  Hospitalist [phone]      Your Vitals Were     BP Pulse Temp Resp    119/69 (BP Location: Left arm, Patient Position: Lying, BP Method: Automatic) 64 98.3 °F (36.8 °C) (Oral) 18    Height Weight SpO2 BMI    5' 11" (1.803 m) 86.1 kg (189 lb 14.4 oz) 98% 26.49 kg/m2      Recent Lab Values     No lab values to display.      Allergies as of 5/12/2017     No Known Allergies      Advance Directives     An advance directive is a document which, in the event you are no longer able to make decisions for yourself, tells your healthcare team what kind of treatment you do or do not want to receive, or who you would like to make those decisions for you.  If you do not currently have an advance directive, [facility] encourages you to create one.  For more information call:  [phone] ([phone]), [phone] ([phone]),  or log on to [email or web address].        Smoking Cessation     If you would like to quit smoking:   You may be eligible for free services if you are a [place] resident and started smoking cigarettes before September 1, 1988.  Call the Smoking Cessation Trust (SCT) toll free at (659) 898-4748 or (032) 932-2822.   Call 4-936-QUIT-NOW if you do not meet the above criteria.   Contact us via email: [email or web address]   View our website for more information: [email or web address]        Language Assistance Services     ATTENTION: Language assistance services are available, free of charge. Please call [phone].      ATENCIÓN: Si habla español, tiene a garcia disposición servicios gratuitos de " asistencia lingüística. Jeanette echavarria [phone].     ANGEL Ý: N?u b?n nói Ti?ng Vi?t, có các d?ch v? h? tr? ngôn ng? mi?n phí dành cho b?n. G?i s? [phone].         [facility] complies with applicable Federal civil rights laws and does not discriminate on the basis of race, color, national origin, age, disability, or sex.

## 2017-05-09 NOTE — SUBJECTIVE & OBJECTIVE
Past Medical History:   Diagnosis Date    Seizures        Past Surgical History:   Procedure Laterality Date    SHOULDER SURGERY         Review of patient's allergies indicates:  No Known Allergies    No current facility-administered medications on file prior to encounter.      Current Outpatient Prescriptions on File Prior to Encounter   Medication Sig    lacosamide 100 mg Tab Take 1 tablet (100 mg total) by mouth 2 (two) times daily.    lamotrigine (LAMICTAL) 25 MG tablet 25 mg.    levetiracetam (KEPPRA) 750 MG Tab Take 1 tablet (750 mg total) by mouth 2 (two) times daily.    phenytoin (DILANTIN) 100 MG ER capsule Take 1 capsule (100 mg total) by mouth 4 (four) times daily.    VIMPAT 200 mg Tab 200 mg.    [DISCONTINUED] lamotrigine (LAMICTAL) 100 MG tablet Take 1 tablet (100 mg total) by mouth 2 (two) times daily.     Family History     Problem Relation (Age of Onset)    Diabetes Maternal Grandfather    Heart disease Father        Social History Main Topics    Smoking status: Former Smoker     Packs/day: 0.10     Types: Cigarettes    Smokeless tobacco: Not on file    Alcohol use 0.5 oz/week     1 Standard drinks or equivalent per week    Drug use: 7.00 per week     Special: Marijuana    Sexual activity: Yes     Partners: Female     Review of Systems   Constitutional: Negative for activity change, appetite change, chills, fatigue and fever.   HENT: Positive for congestion and sore throat. Negative for rhinorrhea and sinus pressure.    Eyes: Negative for pain, redness and visual disturbance.   Respiratory: Negative for cough, chest tightness, shortness of breath, wheezing and stridor.    Cardiovascular: Negative for chest pain, palpitations and leg swelling.   Gastrointestinal: Negative for abdominal distention, abdominal pain, blood in stool, constipation, diarrhea, nausea and vomiting.   Endocrine: Negative for cold intolerance and heat intolerance.   Genitourinary: Negative for dysuria, frequency,  hematuria and urgency.   Musculoskeletal: Negative for arthralgias, back pain, myalgias and neck pain.   Skin: Negative for color change, pallor and rash.   Neurological: Positive for seizures. Negative for dizziness, tremors, syncope, weakness, numbness and headaches.   Hematological: Does not bruise/bleed easily.   Psychiatric/Behavioral: Negative for agitation, confusion and hallucinations. The patient is not nervous/anxious.      Objective:     Vital Signs (Most Recent):  Temp: 98.9 °F (37.2 °C) (05/09/17 1500)  Pulse: 65 (05/09/17 1500)  Resp: 18 (05/09/17 1500)  BP: 121/71 (05/09/17 1500)  SpO2: 98 % (05/09/17 1500) Vital Signs (24h Range):  Temp:  [98.9 °F (37.2 °C)] 98.9 °F (37.2 °C)  Pulse:  [65] 65  Resp:  [18] 18  SpO2:  [98 %] 98 %  BP: (121)/(71) 121/71     Weight: 86.1 kg (189 lb 14.4 oz)  Body mass index is 26.49 kg/(m^2).    Physical Exam   Constitutional: He is oriented to person, place, and time. He appears well-developed and well-nourished. No distress.   HENT:   Head: Normocephalic and atraumatic.   Mouth/Throat: Oropharynx is clear and moist.   Eyes: Conjunctivae and EOM are normal. Pupils are equal, round, and reactive to light. No scleral icterus.   Neck: Normal range of motion. Neck supple. No JVD present. No tracheal deviation present. No thyromegaly present.   Cardiovascular: Normal rate, regular rhythm and intact distal pulses.  Exam reveals no gallop and no friction rub.    No murmur heard.  Pulmonary/Chest: Effort normal and breath sounds normal. No respiratory distress. He has no wheezes. He has no rales.   Abdominal: Soft. Bowel sounds are normal. He exhibits no distension and no mass. There is no tenderness. There is no rebound and no guarding.   Musculoskeletal: Normal range of motion. He exhibits no edema.   Neurological: He is alert and oriented to person, place, and time. He displays normal reflexes. No cranial nerve deficit.   Skin: Skin is warm and dry. No rash noted. No  erythema.   Psychiatric: He has a normal mood and affect. His behavior is normal.        Significant Labs:   CBC:   Recent Labs  Lab 05/09/17  1539   WBC 7.72   HGB 16.2   HCT 47.3        CMP:   Recent Labs  Lab 05/09/17  1539      K 3.7      CO2 26   GLU 96   BUN 13   CREATININE 1.0   CALCIUM 9.2   PROT 7.4   ALBUMIN 4.2   BILITOT 0.3   ALKPHOS 84   AST 26   ALT 31   ANIONGAP 11   EGFRNONAA >60.0     All pertinent labs within the past 24 hours have been reviewed.

## 2017-05-09 NOTE — ASSESSMENT & PLAN NOTE
- Admit to EMU and place on vEEG  - Check baseline labs, UA, UDS  - Neuro checks and seizure precautions  - Neuro Epilepsy consulted and recommend to hold keppra, decrease dilantin to 100 mg BID, continue Vimpat 200 BID, and continue lamictal 125 mg BID  - IV Valium PRN for GTC greater than 5 minutes - hospital medicine to call epilepsy on call before administering

## 2017-05-09 NOTE — PROGRESS NOTES
Pt placed on cardiac monitoring. Tele shows ST elevation, however most recent EKG is NSR. Call placed to MED E to report discrepancy. Pt asymptomatic, denies pain and discomfort.Pleasant mood. New orders rec'd. Care endorsed to oncoming shift nurse to f/u.
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
## 2017-05-10 DIAGNOSIS — G40.219 LOCALIZATION-RELATED SYMPTOMATIC EPILEPSY AND EPILEPTIC SYNDROMES WITH COMPLEX PARTIAL SEIZURES, INTRACTABLE, WITHOUT STATUS EPILEPTICUS: Primary | ICD-10-CM

## 2017-05-10 LAB
POCT GLUCOSE: 107 MG/DL (ref 70–110)
T4 FREE SERPL-MCNC: 0.94 NG/DL

## 2017-05-10 PROCEDURE — 25000003 PHARM REV CODE 250: Performed by: PHYSICIAN ASSISTANT

## 2017-05-10 PROCEDURE — 84439 ASSAY OF FREE THYROXINE: CPT

## 2017-05-10 PROCEDURE — 63600175 PHARM REV CODE 636 W HCPCS

## 2017-05-10 PROCEDURE — 63600175 PHARM REV CODE 636 W HCPCS: Performed by: PHYSICIAN ASSISTANT

## 2017-05-10 PROCEDURE — 95957 EEG DIGITAL ANALYSIS: CPT

## 2017-05-10 PROCEDURE — 95951 HC EEG MONITORING/VIDEO RECORD: CPT

## 2017-05-10 PROCEDURE — 99232 SBSQ HOSP IP/OBS MODERATE 35: CPT | Mod: ,,, | Performed by: PHYSICIAN ASSISTANT

## 2017-05-10 PROCEDURE — 36415 COLL VENOUS BLD VENIPUNCTURE: CPT

## 2017-05-10 PROCEDURE — 20600001 HC STEP DOWN PRIVATE ROOM

## 2017-05-10 RX ORDER — DIAZEPAM 10 MG/2ML
10 INJECTION INTRAMUSCULAR ONCE
Status: DISCONTINUED | OUTPATIENT
Start: 2017-05-10 | End: 2017-05-10

## 2017-05-10 RX ORDER — LORAZEPAM 2 MG/ML
INJECTION INTRAMUSCULAR
Status: COMPLETED
Start: 2017-05-10 | End: 2017-05-10

## 2017-05-10 RX ORDER — LEVETIRACETAM 750 MG/1
750 TABLET ORAL ONCE
Status: COMPLETED | OUTPATIENT
Start: 2017-05-10 | End: 2017-05-10

## 2017-05-10 RX ORDER — LACOSAMIDE 50 MG/1
200 TABLET ORAL ONCE
Status: COMPLETED | OUTPATIENT
Start: 2017-05-10 | End: 2017-05-10

## 2017-05-10 RX ORDER — HALOPERIDOL 5 MG/ML
INJECTION INTRAMUSCULAR
Status: COMPLETED
Start: 2017-05-10 | End: 2017-05-10

## 2017-05-10 RX ORDER — LEVETIRACETAM 750 MG/1
750 TABLET ORAL 2 TIMES DAILY
Status: DISCONTINUED | OUTPATIENT
Start: 2017-05-10 | End: 2017-05-11

## 2017-05-10 RX ORDER — LORAZEPAM 2 MG/ML
1 INJECTION INTRAMUSCULAR
Status: DISCONTINUED | OUTPATIENT
Start: 2017-05-10 | End: 2017-05-12 | Stop reason: HOSPADM

## 2017-05-10 RX ORDER — HALOPERIDOL 5 MG/ML
5 INJECTION INTRAMUSCULAR EVERY 6 HOURS PRN
Status: DISCONTINUED | OUTPATIENT
Start: 2017-05-10 | End: 2017-05-10

## 2017-05-10 RX ORDER — HALOPERIDOL 5 MG/ML
5 INJECTION INTRAMUSCULAR EVERY 6 HOURS PRN
Status: DISCONTINUED | OUTPATIENT
Start: 2017-05-10 | End: 2017-05-12 | Stop reason: HOSPADM

## 2017-05-10 RX ADMIN — ACETAMINOPHEN 650 MG: 325 TABLET ORAL at 10:05

## 2017-05-10 RX ADMIN — LACOSAMIDE 200 MG: 100 TABLET, FILM COATED ORAL at 09:05

## 2017-05-10 RX ADMIN — LEVETIRACETAM 750 MG: 750 TABLET ORAL at 10:05

## 2017-05-10 RX ADMIN — ACETAMINOPHEN 650 MG: 325 TABLET ORAL at 09:05

## 2017-05-10 RX ADMIN — ACETAMINOPHEN 650 MG: 325 TABLET ORAL at 02:05

## 2017-05-10 RX ADMIN — LEVETIRACETAM 750 MG: 750 TABLET ORAL at 09:05

## 2017-05-10 RX ADMIN — LAMOTRIGINE 100 MG: 100 TABLET ORAL at 09:05

## 2017-05-10 RX ADMIN — SODIUM CHLORIDE, PRESERVATIVE FREE 3 ML: 5 INJECTION INTRAVENOUS at 09:05

## 2017-05-10 RX ADMIN — SODIUM CHLORIDE, PRESERVATIVE FREE 3 ML: 5 INJECTION INTRAVENOUS at 02:05

## 2017-05-10 RX ADMIN — LAMOTRIGINE 25 MG: 100 TABLET ORAL at 09:05

## 2017-05-10 RX ADMIN — PHENYTOIN SODIUM 100 MG: 100 CAPSULE ORAL at 09:05

## 2017-05-10 RX ADMIN — ONDANSETRON 4 MG: 2 INJECTION INTRAMUSCULAR; INTRAVENOUS at 09:05

## 2017-05-10 RX ADMIN — LORAZEPAM 2 MG: 2 INJECTION INTRAMUSCULAR; INTRAVENOUS at 05:05

## 2017-05-10 RX ADMIN — HALOPERIDOL LACTATE 5 MG: 5 INJECTION, SOLUTION INTRAMUSCULAR at 04:05

## 2017-05-10 RX ADMIN — LAMOTRIGINE 125 MG: 100 TABLET ORAL at 10:05

## 2017-05-10 RX ADMIN — LACOSAMIDE 200 MG: 50 TABLET, FILM COATED ORAL at 10:05

## 2017-05-10 NOTE — SUBJECTIVE & OBJECTIVE
Interval History: Patient noted to have convulsive episode overnight with post ictal agitation. Fatigued this morning and resting in bed. Alert and oriented x 3. No focal weakness/numbness. Denies headache, vision change, or weakness.     Review of Systems   Constitutional: Positive for fatigue. Negative for activity change, appetite change, chills and fever.   Respiratory: Negative for cough, shortness of breath and wheezing.    Cardiovascular: Negative for chest pain, palpitations and leg swelling.   Gastrointestinal: Negative for abdominal pain, constipation, diarrhea, nausea and vomiting.   Neurological: Positive for seizures. Negative for dizziness, numbness and headaches.   Psychiatric/Behavioral: Positive for agitation (overnight).     Objective:     Vital Signs (Most Recent):  Temp: 98.3 °F (36.8 °C) (05/10/17 1200)  Pulse: 75 (05/10/17 1200)  Resp: 20 (05/10/17 1200)  BP: (!) 132/59 (05/10/17 1200)  SpO2: 100 % (05/10/17 1200) Vital Signs (24h Range):  Temp:  [97.5 °F (36.4 °C)-98.9 °F (37.2 °C)] 98.3 °F (36.8 °C)  Pulse:  [56-75] 75  Resp:  [16-20] 20  SpO2:  [97 %-100 %] 100 %  BP: (110-132)/(52-71) 132/59     Weight: 86.1 kg (189 lb 14.4 oz)  Body mass index is 26.49 kg/(m^2).  No intake or output data in the 24 hours ending 05/10/17 1446   Physical Exam   Constitutional: He is oriented to person, place, and time. He appears well-developed and well-nourished. No distress.   Neck: Normal range of motion. Neck supple. No JVD present.   Cardiovascular: Normal rate, regular rhythm and intact distal pulses.    No murmur heard.  Pulmonary/Chest: Effort normal and breath sounds normal. He has no wheezes. He has no rales.   Abdominal: Soft. Bowel sounds are normal. He exhibits no distension and no mass. There is no tenderness. There is no guarding.   Neurological: He is alert and oriented to person, place, and time. No cranial nerve deficit.     Significant Labs:   BMP:   Recent Labs  Lab 05/09/17  1539   GLU 96       K 3.7      CO2 26   BUN 13   CREATININE 1.0   CALCIUM 9.2   MG 2.3     CBC:   Recent Labs  Lab 05/09/17  1539   WBC 7.72   HGB 16.2   HCT 47.3

## 2017-05-10 NOTE — SIGNIFICANT EVENT
Code michael called for violent behavior following a seizure. Multiple staff nurses at bedside for assistance and unable to redirect patient. Security en route to bedside to help de-escalate situation.

## 2017-05-10 NOTE — NURSING
Patient has experienced four events during 7A-7P shift today.  Patient verbalized feeling an aura and then having a feeling of bobby vu right before zoning out.  Patient has not had any violent outbreaks this shift and has no complaints of pain or disorientation  At this time.

## 2017-05-10 NOTE — PROGRESS NOTES
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 He is direct admission to Epilepsy Monitoring Unit (EMU) for further evaluation of convulsions. Per patient, his last episode was Feb 2017 and events described as nonsensical speech or screaming followed by turning head to the Left and then full body convulsions. He reports episodes occur while sleeping right before he wakes up and have occasional associated bladder incontinence. He endorses 4 episodes in the last year but initially would occur every couple of month. Patient is compliant with home AEDs and follows with Neuro Epilepsy (see full clinic note 4/20/17 for further details). Denies fevers, chills, sweats, recent illness, N/V, abdominal pain, or weight changes. He endorses mild nasal congestion and sore throat. He is a current daily marijuana user and former tobacco user of 1 pack per week but quit 2 months ago.     Admitted to hospital medicine for further management.       Hospital Course:  Admitted to EMU for monitoring of convulsions. Neuro Epilepsy consulted and following. Seizure captured 5/9-5/10 and plan to continue monitoring.    Interval History: Patient noted to have convulsive episode overnight with post ictal agitation. Fatigued this morning and resting in bed. Alert and oriented x 3. No focal weakness/numbness. Denies headache, vision change, or weakness.     Review of Systems   Constitutional: Positive for fatigue. Negative for activity change, appetite  change, chills and fever.   Respiratory: Negative for cough, shortness of breath and wheezing.    Cardiovascular: Negative for chest pain, palpitations and leg swelling.   Gastrointestinal: Negative for abdominal pain, constipation, diarrhea, nausea and vomiting.   Neurological: Positive for seizures. Negative for dizziness, numbness and headaches.   Psychiatric/Behavioral: Positive for agitation (overnight).     Objective:     Vital Signs (Most Recent):  Temp: 98.3 °F (36.8 °C) (05/10/17 1200)  Pulse: 75 (05/10/17 1200)  Resp: 20 (05/10/17 1200)  BP: (!) 132/59 (05/10/17 1200)  SpO2: 100 % (05/10/17 1200) Vital Signs (24h Range):  Temp:  [97.5 °F (36.4 °C)-98.9 °F (37.2 °C)] 98.3 °F (36.8 °C)  Pulse:  [56-75] 75  Resp:  [16-20] 20  SpO2:  [97 %-100 %] 100 %  BP: (110-132)/(52-71) 132/59     Weight: 86.1 kg (189 lb 14.4 oz)  Body mass index is 26.49 kg/(m^2).  No intake or output data in the 24 hours ending 05/10/17 1446   Physical Exam   Constitutional: He is oriented to person, place, and time. He appears well-developed and well-nourished. No distress.   Neck: Normal range of motion. Neck supple. No JVD present.   Cardiovascular: Normal rate, regular rhythm and intact distal pulses.    No murmur heard.  Pulmonary/Chest: Effort normal and breath sounds normal. He has no wheezes. He has no rales.   Abdominal: Soft. Bowel sounds are normal. He exhibits no distension and no mass. There is no tenderness. There is no guarding.   Neurological: He is alert and oriented to person, place, and time. No cranial nerve deficit.     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  - 1 seizure captured 5/9-10 on vEEG with  post-ictal agitation  - neuro epilepsy following and recs appreciated  - Neuro checks and seizure precautions  - Holding keppra, decrease dilantin to 100 mg BID, continue Vimpat 200 BID, and continue lamictal 125 mg BID  - IV Valium PRN for GTC greater than 5 minutes - hospital medicine to call epilepsy on call before administering  - Haldol PRN for post-ictal agitation    VTE Risk Mitigation         Ordered     Low Risk of VTE  Once      05/09/17 [id number]          Yee Campbell PA-C  Department of Hospital Medicine   [facility]

## 2017-05-10 NOTE — PROGRESS NOTES
Ativan 2mg IM was administered for severe agitation. After a few minutes pt is now calmer, non violent towards staff and redirectable. 4 point restraints discontinued. Will continue to monitor closely. Remains with continuous EEG monitoring. Mother back to bedside. Pt states doesn't remember the violent behavior event.

## 2017-05-10 NOTE — ASSESSMENT & PLAN NOTE
- 1 seizure captured 5/9-10 on vEEG with post-ictal agitation  - neuro epilepsy following and recs appreciated  - Neuro checks and seizure precautions  - Holding keppra, decrease dilantin to 100 mg BID, continue Vimpat 200 BID, and continue lamictal 125 mg BID  - IV Valium PRN for GTC greater than 5 minutes - hospital medicine to call epilepsy on call before administering  - Haldol PRN for post-ictal agitation

## 2017-05-10 NOTE — PLAN OF CARE
Problem: Patient Care Overview  Goal: Individualization & Mutuality  Outcome: Ongoing (interventions implemented as appropriate)  Plan care discussed with patient and family, they verbalized understanding, pt with EEG monitoring. Pt has remained free of falls, precautions taken. Mother at pt bedside.
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
## 2017-05-11 LAB
AMPHET+METHAMPHET UR QL: NEGATIVE
BARBITURATES UR QL SCN>200 NG/ML: NEGATIVE
BENZODIAZ UR QL SCN>200 NG/ML: NEGATIVE
BILIRUB UR QL STRIP: NEGATIVE
BZE UR QL SCN: NEGATIVE
CANNABINOIDS UR QL SCN: NORMAL
CLARITY UR REFRACT.AUTO: CLEAR
COLOR UR AUTO: NORMAL
CREAT UR-MCNC: 48 MG/DL
GLUCOSE UR QL STRIP: NEGATIVE
HGB UR QL STRIP: NEGATIVE
KETONES UR QL STRIP: NEGATIVE
LEUKOCYTE ESTERASE UR QL STRIP: NEGATIVE
METHADONE UR QL SCN>300 NG/ML: NEGATIVE
NITRITE UR QL STRIP: NEGATIVE
OPIATES UR QL SCN: NEGATIVE
PCP UR QL SCN>25 NG/ML: NEGATIVE
PH UR STRIP: 7 [PH] (ref 5–8)
PROT UR QL STRIP: NEGATIVE
SP GR UR STRIP: 1 (ref 1–1.03)
TOXICOLOGY INFORMATION: NORMAL
URN SPEC COLLECT METH UR: NORMAL
UROBILINOGEN UR STRIP-ACNC: NEGATIVE EU/DL

## 2017-05-11 PROCEDURE — 95957 EEG DIGITAL ANALYSIS: CPT

## 2017-05-11 PROCEDURE — 25000003 PHARM REV CODE 250: Performed by: PHYSICIAN ASSISTANT

## 2017-05-11 PROCEDURE — 81003 URINALYSIS AUTO W/O SCOPE: CPT

## 2017-05-11 PROCEDURE — 99231 SBSQ HOSP IP/OBS SF/LOW 25: CPT | Mod: ,,, | Performed by: PHYSICIAN ASSISTANT

## 2017-05-11 PROCEDURE — 95951 HC EEG MONITORING/VIDEO RECORD: CPT

## 2017-05-11 PROCEDURE — 20600001 HC STEP DOWN PRIVATE ROOM

## 2017-05-11 PROCEDURE — 82570 ASSAY OF URINE CREATININE: CPT

## 2017-05-11 RX ORDER — LEVETIRACETAM 500 MG/1
1000 TABLET ORAL ONCE
Status: COMPLETED | OUTPATIENT
Start: 2017-05-11 | End: 2017-05-11

## 2017-05-11 RX ORDER — LEVETIRACETAM 500 MG/1
1000 TABLET ORAL 2 TIMES DAILY
Status: DISCONTINUED | OUTPATIENT
Start: 2017-05-11 | End: 2017-05-12 | Stop reason: HOSPADM

## 2017-05-11 RX ADMIN — ACETAMINOPHEN 650 MG: 325 TABLET ORAL at 10:05

## 2017-05-11 RX ADMIN — LAMOTRIGINE 100 MG: 100 TABLET ORAL at 08:05

## 2017-05-11 RX ADMIN — SODIUM CHLORIDE, PRESERVATIVE FREE 3 ML: 5 INJECTION INTRAVENOUS at 06:05

## 2017-05-11 RX ADMIN — SODIUM CHLORIDE, PRESERVATIVE FREE 3 ML: 5 INJECTION INTRAVENOUS at 10:05

## 2017-05-11 RX ADMIN — LAMOTRIGINE 25 MG: 100 TABLET ORAL at 10:05

## 2017-05-11 RX ADMIN — LAMOTRIGINE 25 MG: 100 TABLET ORAL at 08:05

## 2017-05-11 RX ADMIN — SODIUM CHLORIDE, PRESERVATIVE FREE 3 ML: 5 INJECTION INTRAVENOUS at 05:05

## 2017-05-11 RX ADMIN — ACETAMINOPHEN 650 MG: 325 TABLET ORAL at 06:05

## 2017-05-11 RX ADMIN — ONDANSETRON 8 MG: 8 TABLET, ORALLY DISINTEGRATING ORAL at 06:05

## 2017-05-11 RX ADMIN — LEVETIRACETAM 1000 MG: 500 TABLET, FILM COATED ORAL at 05:05

## 2017-05-11 RX ADMIN — LEVETIRACETAM 750 MG: 750 TABLET ORAL at 08:05

## 2017-05-11 RX ADMIN — LEVETIRACETAM 1000 MG: 500 TABLET, FILM COATED ORAL at 10:05

## 2017-05-11 RX ADMIN — LACOSAMIDE 200 MG: 100 TABLET, FILM COATED ORAL at 10:05

## 2017-05-11 RX ADMIN — LAMOTRIGINE 100 MG: 100 TABLET ORAL at 10:05

## 2017-05-11 RX ADMIN — LACOSAMIDE 200 MG: 100 TABLET, FILM COATED ORAL at 08:05
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
## 2017-05-11 NOTE — PROGRESS NOTES
[facility] Hospital Medicine  Progress Note    Patient Name: Alexx Rothman  MRN: [id number]  Patient Class: IP- Inpatient   Admission Date: 5/9/2017  Length of Stay: 2 days  Attending Physician: Buddy Chua MD  Primary Care Provider: Primary Doctor 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 He is direct admission to Epilepsy Monitoring Unit (EMU) for further evaluation of convulsions. Per patient, his last episode was Feb 2017 and events described as nonsensical speech or screaming followed by turning head to the Left and then full body convulsions. He reports episodes occur while sleeping right before he wakes up and have occasional associated bladder incontinence. He endorses 4 episodes in the last year but initially would occur every couple of month. Patient is compliant with home AEDs and follows with Neuro Epilepsy (see full clinic note 4/20/17 for further details). Denies fevers, chills, sweats, recent illness, N/V, abdominal pain, or weight changes. He endorses mild nasal congestion and sore throat. He is a current daily marijuana user and former tobacco user of 1 pack per week but quit 2 months ago.     Admitted to hospital medicine for further management.       Hospital Course:  Admitted to EMU for monitoring of convulsions. Neuro Epilepsy consulted and following. Seizure captured 5/9-5/10 and plan to continue monitoring. Resumed on AEDs per neuro epilepsy.     Interval History: No acute events overnight. Patient resting in bed. No further agitation or combativeness. Case discussed with neuro epilepsy.     Review of Systems   Constitutional: Negative for activity change, appetite change, chills, fatigue and fever.   Respiratory: Negative for cough,  shortness of breath and wheezing.    Cardiovascular: Negative for chest pain, palpitations and leg swelling.   Gastrointestinal: Negative for abdominal pain, constipation, diarrhea, nausea and vomiting.   Neurological: Positive for seizures. Negative for dizziness, numbness and headaches.     Objective:     Vital Signs (Most Recent):  Temp: 97.7 °F (36.5 °C) (05/11/17 0735)  Pulse: 89 (05/11/17 0735)  Resp: 18 (05/11/17 0735)  BP: 128/79 (05/11/17 0735)  SpO2: 98 % (05/11/17 0735) Vital Signs (24h Range):  Temp:  [97.7 °F (36.5 °C)-99.7 °F (37.6 °C)] 97.7 °F (36.5 °C)  Pulse:  [62-99] 89  Resp:  [16-20] 18  SpO2:  [95 %-100 %] 98 %  BP: (112-132)/(59-79) 128/79     Weight: 86.1 kg (189 lb 14.4 oz)  Body mass index is 26.49 kg/(m^2).  No intake or output data in the 24 hours ending 05/11/17 1040   Physical Exam   Constitutional: He is oriented to person, place, and time. He appears well-developed and well-nourished. No distress.   Neck: Normal range of motion. Neck supple. No JVD present.   Cardiovascular: Normal rate, regular rhythm and intact distal pulses.    No murmur heard.  Pulmonary/Chest: Effort normal and breath sounds normal. He has no wheezes. He has no rales.   Abdominal: Soft. Bowel sounds are normal. He exhibits no distension and no mass. There is no tenderness. There is no guarding.   Neurological: He is alert and oriented to person, place, and time. No cranial nerve deficit.         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  - 1 seizure captured 5/9-10 on vEEG with post-ictal agitation  - neuro epilepsy following and recs appreciated  - Neuro checks and seizure precautions  - Resumed keppra 750mg BIDand holding dilantin; continue Vimpat 200 BID, and continue lamictal 125 mg BID per neuro epilepsy   - IV Valium PRN for GTC greater than 5 minutes - hospital medicine to call epilepsy on call before administering  - Haldol PRN for  post-ictal agitation    VTE Risk Mitigation         Ordered     Low Risk of VTE  Once      05/09/17 1502          Yee Campbell PA-C  Department of Hospital Medicine   [facility]

## 2017-05-11 NOTE — SUBJECTIVE & OBJECTIVE
Interval History: No acute events overnight. Patient resting in bed. No further agitation or combativeness. Case discussed with neuro epilepsy.     Review of Systems   Constitutional: Negative for activity change, appetite change, chills, fatigue and fever.   Respiratory: Negative for cough, shortness of breath and wheezing.    Cardiovascular: Negative for chest pain, palpitations and leg swelling.   Gastrointestinal: Negative for abdominal pain, constipation, diarrhea, nausea and vomiting.   Neurological: Positive for seizures. Negative for dizziness, numbness and headaches.     Objective:     Vital Signs (Most Recent):  Temp: 97.7 °F (36.5 °C) (05/11/17 0735)  Pulse: 89 (05/11/17 0735)  Resp: 18 (05/11/17 0735)  BP: 128/79 (05/11/17 0735)  SpO2: 98 % (05/11/17 0735) Vital Signs (24h Range):  Temp:  [97.7 °F (36.5 °C)-99.7 °F (37.6 °C)] 97.7 °F (36.5 °C)  Pulse:  [62-99] 89  Resp:  [16-20] 18  SpO2:  [95 %-100 %] 98 %  BP: (112-132)/(59-79) 128/79     Weight: 86.1 kg (189 lb 14.4 oz)  Body mass index is 26.49 kg/(m^2).  No intake or output data in the 24 hours ending 05/11/17 1040   Physical Exam   Constitutional: He is oriented to person, place, and time. He appears well-developed and well-nourished. No distress.   Neck: Normal range of motion. Neck supple. No JVD present.   Cardiovascular: Normal rate, regular rhythm and intact distal pulses.    No murmur heard.  Pulmonary/Chest: Effort normal and breath sounds normal. He has no wheezes. He has no rales.   Abdominal: Soft. Bowel sounds are normal. He exhibits no distension and no mass. There is no tenderness. There is no guarding.   Neurological: He is alert and oriented to person, place, and time. No cranial nerve deficit.

## 2017-05-11 NOTE — PLAN OF CARE
Problem: Patient Care Overview  Goal: Plan of Care Review  Outcome: Ongoing (interventions implemented as appropriate)  Plan of care reviewed with pt and spouse at bedside. Safety precautions initiated. Bed locked in lowest position, call bell within reach, bed alarm on. [id number]. See previous note for details. Will continue to monitor.     Problem: Health Knowledge, Opportunity for Enhanced  Goal: Patient/Family/Significant Other/Caregiver will verbalize/demonstrate an understanding of teaching/learning goals.  Outcome: Ongoing (interventions implemented as appropriate)

## 2017-05-11 NOTE — NURSING
SZ event button activated at 2016. Pt spouse explained pt verbalized feeling a sense of bobby vu, then immediately began lip smacking, starring blankly at the wall, not responding to questions appropiately  in a timely manner, and repeating words upon finally responding. Pt 02 88%, 2L NC applied,  while laying perfectly still. Pt returned to baseline at 2020.  VSS. Will continue to monitor.

## 2017-05-11 NOTE — ASSESSMENT & PLAN NOTE
- 1 seizure captured 5/9-10 on vEEG with post-ictal agitation  - neuro epilepsy following and recs appreciated  - Neuro checks and seizure precautions  - Resumed keppra 750mg BIDand holding dilantin; continue Vimpat 200 BID, and continue lamictal 125 mg BID per neuro epilepsy   - IV Valium PRN for GTC greater than 5 minutes - hospital medicine to call epilepsy on call before administering  - Haldol PRN for post-ictal agitation

## 2017-05-11 NOTE — NURSING
Pt experienced N/V briefly after medication administered. Pt vomit 3x. Zofran administered, and a one time dose of each PM medication was put in to replace the medication thrown up. VSS. Will continue to monitor.

## 2017-05-12 ENCOUNTER — TELEPHONE (OUTPATIENT)
Dept: NEUROLOGY | Facility: CLINIC | Age: 32
End: 2017-05-12

## 2017-05-12 VITALS
TEMPERATURE: 98 F | HEART RATE: 64 BPM | DIASTOLIC BLOOD PRESSURE: 69 MMHG | HEIGHT: 71 IN | BODY MASS INDEX: 26.58 KG/M2 | WEIGHT: 189.88 LBS | SYSTOLIC BLOOD PRESSURE: 119 MMHG | RESPIRATION RATE: 18 BRPM | OXYGEN SATURATION: 98 %

## 2017-05-12 PROCEDURE — 25000003 PHARM REV CODE 250: Performed by: PHYSICIAN ASSISTANT

## 2017-05-12 PROCEDURE — 99239 HOSP IP/OBS DSCHRG MGMT >30: CPT | Mod: ,,, | Performed by: PHYSICIAN ASSISTANT

## 2017-05-12 RX ORDER — LAMOTRIGINE 25 MG/1
25 TABLET ORAL 2 TIMES DAILY
Start: 2017-05-12 | End: 2017-07-28 | Stop reason: SDUPTHER

## 2017-05-12 RX ORDER — LEVETIRACETAM 1000 MG/1
1000 TABLET ORAL 2 TIMES DAILY
Qty: 60 TABLET | Refills: 11 | Status: SHIPPED | OUTPATIENT
Start: 2017-05-12 | End: 2018-05-12

## 2017-05-12 RX ORDER — LAMOTRIGINE 100 MG/1
100 TABLET ORAL 2 TIMES DAILY
COMMUNITY
End: 2017-07-28 | Stop reason: SDUPTHER

## 2017-05-12 RX ADMIN — LEVETIRACETAM 1000 MG: 500 TABLET, FILM COATED ORAL at 10:05

## 2017-05-12 RX ADMIN — LAMOTRIGINE 25 MG: 100 TABLET ORAL at 10:05

## 2017-05-12 RX ADMIN — LAMOTRIGINE 100 MG: 100 TABLET ORAL at 10:05

## 2017-05-12 RX ADMIN — LACOSAMIDE 200 MG: 100 TABLET, FILM COATED ORAL at 10:05

## 2017-05-12 NOTE — DISCHARGE SUMMARY
[facility] Hospital Medicine  Discharge Summary      Patient Name: Alexx Rothman  MRN: [id number]  Admission Date: 5/9/2017  Hospital Length of Stay: 3 days  Discharge Date and Time:  05/12/2017 9:17 AM  Attending Physician: Buddy Chua MD   Discharging Provider: Yee Campbell PA-C  Primary Care Provider: Primary Doctor [facility] Medicine Team: [facility] E Yee Campbell PA-C    HPI:   31 year old male with past medical history of seizures since Nov 2007. He is direct admission to Epilepsy Monitoring Unit (EMU) for further evaluation of convulsions. Per patient, his last episode was Feb 2017 and events described as nonsensical speech or screaming followed by turning head to the Left and then full body convulsions. He reports episodes occur while sleeping right before he wakes up and have occasional associated bladder incontinence. He endorses 4 episodes in the last year but initially would occur every couple of month. Patient is compliant with home AEDs and follows with Neuro Epilepsy (see full clinic note 4/20/17 for further details). Denies fevers, chills, sweats, recent illness, N/V, abdominal pain, or weight changes. He endorses mild nasal congestion and sore throat. He is a current daily marijuana user and former tobacco user of 1 pack per week but quit 2 months ago.     Admitted to hospital medicine for further management.       * No surgery found *      Indwelling Lines/Drains at time of discharge:   Lines/Drains/Airways          No matching active lines, drains, or airways        Hospital Course:   Admitted to EMU for monitoring of convulsions. Neuro Epilepsy consulted and following. Multiple events captured on vEEG with right onset noted per neuro epilepsy. Neuro exam returned to baseline. Recommend patient to have PET scan on day of discharge. Adjusted AEDs per neuro epilepsy. Discharged home in stable condition with outpatient follow up with Dr. Mcintyre.       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   - Neuro epilepsy following and recs appreciated  - Neuro checks and seizure precautions  - Recommend patient to continue keppra 1000mg BID, Vimpat 200 BID, and lamictal 125 mg BID on discharge per neuro epilepsy. Discontinue Dilantin.   - IV Valium PRN for GTC greater than 5 minutes - hospital medicine to call epilepsy on call before administering      Discharged Condition: good    Disposition: Home or Self Care    Follow Up:  Follow-up Information     Follow up with Jammie Mcintyre MD.    Specialty:  Neurology    Why:  Follow up as scheduled    Contact information:    [address] [place] [address]  [id number]  [facility] [id number]  [phone]          Patient Instructions:     Diet general   Order Comments: NPO on discharge until PET scan complete then resume regular diet     Activity as tolerated     Call MD for:  temperature >100.4     Call MD for:  persistent nausea and vomiting or diarrhea     Call MD for:  severe uncontrolled pain     Call MD for:  severe persistent headache     Call MD for:  increased confusion or weakness     Call MD for:  persistent dizziness, light-headedness, or visual disturbances     Call MD for:  worsening rash       Medications:  Reconciled Home  Medications:   Current Discharge Medication List      CONTINUE these medications which have CHANGED    Details   !! lamotrigine (LAMICTAL) 25 MG tablet Take 1 tablet (25 mg total) by mouth 2 (two) times daily.      levetiracetam (KEPPRA) 1000 MG tablet Take 1 tablet (1,000 mg total) by mouth 2 (two) times daily.  Qty: 60 tablet, Refills: 11       !! - Potential duplicate medications found. Please discuss with provider.      CONTINUE these medications which have NOT CHANGED    Details   !! lamotrigine (LAMICTAL) 100 MG tablet Take 100 mg by mouth 2 (two) times daily.      VIMPAT 200 mg Tab Take 200 mg by mouth 2 (two) times daily.        !! - Potential duplicate medications found. Please discuss with provider.      STOP taking these medications       phenytoin (DILANTIN) 100 MG ER capsule Comments:   Reason for Stopping:             Time spent on the discharge of patient: 33 minutes    Yee Campbell PA-C  Department of Hospital Medicine  [facility]

## 2017-05-12 NOTE — TELEPHONE ENCOUNTER
Spoke to [facility] and obtained approval for pts scheduled 3TMRI at [facility] 5/15. Approval #[id number] good thru 5/12-6/10/17. Spoke to [facility] and updated with reference#.

## 2017-05-12 NOTE — ASSESSMENT & PLAN NOTE
- Multiple seizures captured on vEEG with post-ictal agitation noted initially.   - Neuro epilepsy following and recs appreciated  - Neuro checks and seizure precautions  - Recommend patient to continue keppra 1000mg BID, Vimpat 200 BID, and lamictal 125 mg BID on discharge per neuro epilepsy. Discontinue Dilantin.   - IV Valium PRN for GTC greater than 5 minutes - hospital medicine to call epilepsy on call before administering
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
## 2017-05-15 ENCOUNTER — PATIENT OUTREACH (OUTPATIENT)
Dept: ADMINISTRATIVE | Facility: CLINIC | Age: 32
End: 2017-05-15
Payer: COMMERCIAL

## 2017-05-15 NOTE — PATIENT INSTRUCTIONS
Discharge Instructions for Epilepsy  You have been diagnosed with epilepsy, a disorder of recurring seizures. When you have a seizure, an electrical disturbance happens in your brain. There are different kinds of seizures, and each patient may have one or many types of seizures. Here are some guidelines for you and your family.  If you have a seizure  Ask friends and family members to learn seizure management. Also, tell them to do the following if you have a seizure:  · Clear the area to prevent injury.  · Position you on a flat, carpeted surface, if possible.  · Dont try to restrain you.  · Dont put anything in your mouth.  · Turn you onto your side if you start to vomit.  · Keep track of the date and time the seizure started, how long it lasted, whether or not you lost consciousness, a description of your body movements, what provoked the seizure (if known), and any injuries you suffered. Using a watch may help keep correct time of events.    · Stay with you until you regain consciousness.  · Call 911 if the seizure is longer than 5 minutes, if there are multiple seizures, or if you do not begin to wake up after the seizure stops.    Activities  Following are some things to consider:  · Enjoy your normal activities. Most people with epilepsy lead normal lives.  · Avoid hazardous activities, such as mountain climbing or scuba diving. A seizure under these conditions could lead to a fatal accident.  · Do not swim alone or participate in other similar activities without others nearby.  · Ask your healthcare provider about any restrictions on driving or other activities.  · Check with your state department of public safety to learn whether there are any driving limitations based on your condition.  Other home care  Other considerations:  · Take your medicine exactly as directed. Skipping doses can affect the way your body handles the medicine, which could cause you to have a seizure.  · Dont drink alcohol or use  any medicine without talking with your healthcare provider first.  · Seizure medicines may interact with other medicines. Make sure all of your healthcare providers have a list of all your medicines.   · Birth control pills may not work as effectively when taking seizure medicines. Ask your healthcare provider if a change in birth control is needed.   · Wear a medical alert pendant or bracelet that alerts others to your condition, especially if you are allergic to seizure medicine.  · Join a local support group. Ask your healthcare provider for names and phone numbers.  .  When to seek medical attention  Tell your family members or friends to call 911 right away if you have:  · Seizure that lasts more than 5 minutes  · Multiple seizures in a row  · No recovery of consciousness after the seizure stops  Otherwise, have them call your healthcare provider right away if you have:  · Seizures that are getting longer and worse  · Seizures that are different from those youve had in the past  · Seizures strong enough to cause injury  · Skin rash  · Fever of 100.4°F (38°C) or higher, or as directed by your healthcare provider   Date Last Reviewed: 11/5/2015  © 0499-4839 [employer]. [address] [place] [address], [place] [address]. All rights reserved. This information is not intended as a substitute for professional medical care. Always follow your healthcare professional's instructions.

## 2017-05-16 ENCOUNTER — TELEPHONE (OUTPATIENT)
Dept: NEUROLOGY | Facility: CLINIC | Age: 32
End: 2017-05-16

## 2017-05-16 NOTE — TELEPHONE ENCOUNTER
----- Message from Erlinda Ríos sent at 5/16/2017  2:27 PM CDT -----  Contact: Patient [phone]  Patient is returning Swati's call to schedule an appt to go over his test results. Please call patient system would not allow me to schedule appt.

## 2017-05-16 NOTE — TELEPHONE ENCOUNTER
----- Message from Lyly Oleary sent at 5/16/2017 12:05 PM CDT -----  Contact: self  Alexx is calling about his results for MRI, EKG, and Cat Scan.  Pt wants to know if he has to follow up with Dr. Mcintyre or will he be contacted by someone with his results?    Please contact Alexx at [phone]    Thank you

## 2017-05-16 NOTE — TELEPHONE ENCOUNTER
----- Message from Erlinda Ríos sent at 5/16/2017  2:27 PM CDT -----  Contact: Patient [phone]  Patient is returning Swati's call to schedule an appt to go over his test results. Please call patient system would not allow me to schedule appt.

## 2017-05-18 DIAGNOSIS — G40.219 PARTIAL EPILEPSY WITH IMPAIRMENT OF CONSCIOUSNESS, WITH INTRACTABLE EPILEPSY: Primary | ICD-10-CM

## 2017-05-22 ENCOUNTER — TELEPHONE (OUTPATIENT)
Dept: NEUROLOGY | Facility: CLINIC | Age: 32
End: 2017-05-22

## 2017-05-22 NOTE — TELEPHONE ENCOUNTER
Spoke to pt and advised I had given his information to surgical patient as discussed and she would be calling to speak with him about her surgical experience. Advised pt, emu/mri/pet scan will be reviewed at dr Mcintyre appt 6/5. Pt will bring me 3tmri disc from [facility]. Pt verbalized understanding.

## 2017-05-24 ENCOUNTER — TELEPHONE (OUTPATIENT)
Dept: NEUROLOGY | Facility: CLINIC | Age: 32
End: 2017-05-24

## 2017-05-24 NOTE — TELEPHONE ENCOUNTER
The blood work can be viewed in the patient portal, which the pt has access to and will view that way.I explained that the eeg results aren't ready, that they haven't been dictated yet

## 2017-05-24 NOTE — TELEPHONE ENCOUNTER
----- Message from Francisca Starkey sent at 5/24/2017 11:51 AM CDT -----  Contact: pt [phone]  Pt is calling to get his test results.pls call

## 2017-05-25 ENCOUNTER — TELEPHONE (OUTPATIENT)
Dept: NEUROLOGY | Facility: CLINIC | Age: 32
End: 2017-05-25

## 2017-05-25 NOTE — TELEPHONE ENCOUNTER
I called patient this morning to remind him that we need the cd w/images to look at before his visit. he will bring in today

## 2017-06-05 ENCOUNTER — OFFICE VISIT (OUTPATIENT)
Dept: NEUROLOGY | Facility: CLINIC | Age: 32
End: 2017-06-05
Payer: COMMERCIAL

## 2017-06-05 VITALS
WEIGHT: 182.31 LBS | BODY MASS INDEX: 25.43 KG/M2 | HEART RATE: 60 BPM | SYSTOLIC BLOOD PRESSURE: 118 MMHG | DIASTOLIC BLOOD PRESSURE: 69 MMHG

## 2017-06-05 DIAGNOSIS — G40.219 LOCALIZATION-RELATED EPILEPSY WITH COMPLEX PARTIAL SEIZURES WITH INTRACTABLE EPILEPSY: Primary | ICD-10-CM

## 2017-06-05 PROCEDURE — 99999 PR PBB SHADOW E&M-EST. PATIENT-LVL II: CPT | Mod: PBBFAC,,, | Performed by: PSYCHIATRY & NEUROLOGY

## 2017-06-05 PROCEDURE — 99215 OFFICE O/P EST HI 40 MIN: CPT | Mod: S$GLB,,, | Performed by: PSYCHIATRY & NEUROLOGY

## 2017-06-05 NOTE — ASSESSMENT & PLAN NOTE
LRE:  Recent EMU evaluation (5/9-12/2017), c/w R fronto-tenmporal onset with secondary generalization  Neuro-imaging done recently: 3T MRI and PET Brain also suggest R sided focality  - candidate for surgical evaluation    However, patent and family is desirous of a 2nd opinion - they have been in touch with [facility] and is hoping for an appointment soon: they may choose to have surgery done at [facility]    - will facilitate by providing EMU recording of seizures on a disc; MRI and PET images on disc returned to patient today    Seizure frequency:   - infrequent since EMU evaluation with only 2 episodes of aura     AED plan:  - current AEDs:  Lacosamide 200mg bid  Levetiracetam 1000mg bid - wife reports worsening of memory after increasing the dose  Lamotrigine 125mg bid  - offered to try and adjust dosing and maintain on 2 AEDs (Lacosamide and Lamotrigine) in view of side-effects reported with Levetiracetam, but patient stated that he wishes to continue on current regimen until evaluated at [facility]    All questions and concerns voiced by patient and family addressed.
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
## 2017-06-07 ENCOUNTER — TELEPHONE (OUTPATIENT)
Dept: NEUROLOGY | Facility: CLINIC | Age: 32
End: 2017-06-07

## 2017-06-07 NOTE — TELEPHONE ENCOUNTER
----- Message from Erlinda Ríos sent at 6/7/2017 12:33 PM CDT -----  Contact: Patient [phone]  Patient is calling back to get the status on the  Request for a copy of his EEG results. Please call

## 2017-06-08 NOTE — TELEPHONE ENCOUNTER
----- Message from Kushal Singletary sent at 6/8/2017  2:19 PM CDT -----  Contact: Self [phone]  Patient is calling to confirm if the DVD was received EEG, pls call

## 2017-06-12 ENCOUNTER — TELEPHONE (OUTPATIENT)
Dept: NEUROLOGY | Facility: CLINIC | Age: 32
End: 2017-06-12

## 2017-06-12 NOTE — TELEPHONE ENCOUNTER
----- Message from Angie Martinez sent at 6/12/2017 10:32 AM CDT -----  Contact: Self  Mr. Rothman called to speak with someone to see if he can obtain a disk with his EEG results as soon as possible.    Please advise.    Mr. Rothman can be reached at [phone] (c) as soon as possible regarding this message.    Please note that he has also be connected to the [employer] [facility] for additional assistance.    Thanks.

## 2017-06-12 NOTE — TELEPHONE ENCOUNTER
"Contacted patient in regards to request for EEG results(from recent EMU admission, 4 days) being copied to a disc and picked up. I explained that I have spoken to both Dr. Micntyre and the EEG supervisor in regards to the matter. First and foremost, accurate and complete dictation of the disc is necessary before it can be transferred to multiple discs (approx. 96 hours of data from recent EMU admission). Pt is seeking to take/send this EEG info to the [facility] for a second opinion regarding his seizure activity. Pt stated it has been a month since his discharge and doesn't understand why this isn't ready yet. I asked for his patience and explained that this type of dictation and data transfer takes time. 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 It's all ones and zeros(presumably meaning binary code for computers)." I simply stated that the disc he is requesting is not available at this time. Pt then hung up the phone.   "

## 2017-06-13 NOTE — PROCEDURES
EPILEPSY MONITORING UNIT  EEG/VIDEO TELEMETRY REPORT    Alexx Rothman  [id number]  1985    DATE OF SERVICE: 05/09-12/2017.    DATE OF ADMISSION: 5/9/2017  2:13 PM    METHODOLOGY      Electroencephalographic (EEG) is recorded with electrodes placed according to the International 10-20 placement system.  Thirty Two (32) channels of digital signal including the T1 and T2 electrodes are simultaneously recorded from the scalp and also including EKG, EMG  and/or eye movement monitors.  Recording band pass was 0.1 to 512 hz.  Digital video recording of the patient is simultaneously recorded with the EEG.  The patient is instructed report clinical symptoms which may occur during the recording session.  EEG and video recording is stored and archived in digital format. Activation procedures which include photic stimulation, hyperventilation and instructing patients to perform simple task are done in selected patients.         The EEG is displayed on a monitor screen and can be reformatted into different montages for evaluation.  The entire recoding is submitted for computer assisted analysis to detect spike and electrographic seizure activity.  The entire recording is visually reviewed and the times identified by computer analysis as being spikes or seizures are reviewed again.  Compresses spectral analysis (CSA) is also performed on the activity recorded from each individual channel.  This is displayed as a power display of frequencies from 0 to 30 Hz over time.   The CSA analysis is done and displayed continuously.  This is reviewed for asymmetries in power between homologous areas of the scalp and for presence of changes in power which canbe seen when seizures occur.  Sections of suspected abnormalities on the CSA is then compared with the original EEG recording.      [employer] software was also utilized in the review of this study.  This software suite analyzes the EEG recording in multiple domains.  Coherence and  rhythmicity is computed to identify EEG sections which may contain organized seizures.  Each channel undergoes analysis to detect presence of spike and sharp waves which have special and morphological characteristic of epileptic activity.  The routine EEG recording is converted from spacial into frequency domain.  This is then displayed comparing homologous areas to identify areas of significant asymmetry.  Algorithm to identify non-cortically generated artifact is used to separate eye movement, EMG and other artifact from the EEG.      ELECTROENCEPHALOGRAM:    INTERICTAL:  Background activity:   The background rhythm was characterized by theta (6-7 Hz) activity with a 9 Hz posterior dominant alpha rhythm at 30-70 microvolts.   Symmetry and continuity: the background was continuous and symmetric    Sleep:   Normal sleep transients including sleep spindles, K complexes, vertex waves and POSTS were seen.          Activation procedures:   Hyperventilation and photic stimulation were not performed; overnight sleep deprivation was done.    Abnormal activity:   No independent epileptiform discharges, periodic discharges or lateralized rhythmic delta activity were seen.    ICTAL:   SEIZURE # 1: 5/10/17  EEG Onset: 04:41:28, from sleep; Offset: 04:45:11   At onset there is low amplitude spike and wave activity seen for 4 seconds on the right (maximal F8/T2) before electrode and EMG artifacts obscure the seizure  Onset is unclear with EMG artifact on the right. However, sharply contoured delta (2-4Hz) activity is noted within 15 seconds that evolves into spike and wave activity, confined mostly to the right (maximal F8/T2) by 04:42:00 and within the next 20 seconds, involves the left hemisphere as well, becoming generalized by 04:42:45. Generalized EMG artifact obscures the rest of the seizure until 04:43:43 when generalized spike and wave activity is seen through the EMG artifacts until offset at  04:45:11                        Clinical:   At onset, patient is asleep on his right side, facing away from the camera. He is noted to turn on his back and isai-facial movements are noted at 04:42:20 followed by head turn to the left at 04:42:28 and figure of four posturing with extension of the left UE, at 04:43:07.  No PB by the patient.  Following the clinical end at 04:44:14, patient remains confused for prolonged period of time, during which he is noted to get out of bed, pull of electrodes and actively kicking and thrashing about, that subsided only after he was given IV Valium.      SEIZURE # 2: 5/10/17  EEG Onset: 07:57:28, from sleep; Offset: 07:59:14  Onset is unclear with EMG artifact on the right. However, sharply contoured delta (2-4Hz) activity is noted within 15 seconds that evolves into spike and wave activity, confined mostly to the right (maximal F8/T2) and within the next 20 seconds, involves the left hemisphere as well, becoming generalized towards the offset.                 Clinical:   At onset, patient is asleep on his left side. He is then noted to turn on his back and pull off his bedclothes, followed by isai-facial movements and unresponsiveness associated with head and eyes turning to the left.  No PB by the patient.  Post-event he is noted to be confused and gets out of the bed and goes to the bathroom. His mother is by his bedside during this time.      SEIZURE # 3: 5/10/17  EEG Onset: 11:43:17, while awake; offset: 11:45:21  At onset there is electrode artifacts, but delta-theta (2-5Hz) slowing is seen on the right (maximal F8/T2) that evolves into sharply contoured delta (2-3Hz) and into spike and wave activity maximal on the right within 30 seconds and persists until offset.                Clinical:   At onset, patient is noted to be resting with his eyes closed. He tries to sit up in bed and is heard mumbling initially. He has isai-facial automatisms, is seen to look around and has his  eyes turned towards the left during the seizure. He looks around and is noted to have some purposeless movements at the end.  No PB by the patient.  Post-event he is noted to be confused briefly and starts interacting appropriately within 35 seconds    SEIZURE # 4: 5/10/17  EEG Onset: 13:15:13, while awake; offset: 13:17:08  At onset there is right sided spike and wave activity seen for 1 second before electrode and EMG artifacts obscure the seizure. Sharply contoured delta (2-3Hz) activity is seen on the right (maximal F8/T2) that evolves into spike and wave activity maximal on the right within 30 seconds, becomes generalized and persists until offset.          Clinical:   At onset, patient is noted to be asleep on his right side, away from camera. No clinical activity is seen until after offset when patient is noted to pull off his bedclothes, sits up and then gets out of bed.   No PB by the patient.    SEIZURE # 5: 5/10/17  EEG Onset: 15:33:45, while awake; offset: 15:36:46  Onset is briefly obscured (2 seconds) with EMG and electrode artifacts. This is followed by sharply contoured delta (2-4Hz) activity that evolves into spike and wave activity, confined mostly to the right (maximal F8/T2) by 15:34:23 and then involves the left hemisphere as well, becoming generalized until offset.          Clinical:   At onset, patient is noted to be sitting propped up in bed texting. He then turns to his left, sits up and leans forward, retching. He is heard talking (unclear) and presses the event button at the prompt of his wife. He is noted to have isai-facial automatisms at 15:34:28 that persist alternating with retching until the end. He is also noted to have fixed flexion of his left wrist about 14 seconds prior offset that lasts until the end.    SEIZURE # 6: 5/10/17  EEG Onset: 17:47:38, from sleep; offset: 17:49:42  At onset there is right sided spike and wave activity seen mixed with theta (6Hz) activity for 2 seconds  before electrode and EMG artifacts obscure the seizure for 3-4 seconds. Sharply contoured delta (2-3Hz) activity is then seen on the right (maximal F8/T2) that evolves into spike and wave activity maximal on the right within 30 seconds, becomes generalized and persists until offset.          Clinical:   At onset, patient is asleep on his right side, away from the camera. He briefly turns to his left, looks at his wife and then sits up and drinks holding the cup in his right hand. He is then noted to mumble and stare associated with isai-facial automatisms while remaining propped up on his right UE.   Wife presses the event button at this time (17:48:46).  Patient continues to have isai-facial automatisms and eyes are noted to be deviated to the left at 17:48:46. Patient appears to track his wife but remains unresponsive with persistent isai-facial automatisms.  At offset he is noted to pull off his bedcovers and sits up.      SEIZURE # 7: 5/10/17  EEG Onset: 20:18:29, from sleep; offset: 20:25:34  At onset there is right sided sharply contoured theta 7Hz activity seen (maximal Fp2/F8/T2) lasting 6 seconds, followed by faster (alpha 12Hz) activity seen in Fp2/Fz/F8 > T2, that evolves into sharply contoured delta-theta activity, maximal on the right for 1-2 seconds. This is followed by brief 5-6 seconds of electrode and EMG artifacts. Right sided spike and wave activity, maximal F8/T2 is then seen that evolves into generalized spike and wave activity that persists until offset.                    Clinical:   At onset, patient is asleep on his right side, away from the camera. He briefly turns to his left and is heard mumbling and then lays back on his right.  Wife presses the event button at this time (20:18:56).    Patient then turns supine (20:19:09) and is noted to have isai-facial automatisms with eyes turned to the left. He then sits up and starts drinking through a straw (cup handed to him by his wife), propped up  on his right UE. At 20:19:33 he is noted to have his left hand/wrist held in flexion with continuing isai-facial automatisms and eyes turned to the left. He is noted to be mumbling intermittently and not appropriate until offset.       FINAL SUMMARY:  Recording Times  Start on 5/9/17, 16:21  Stop on 5/12/17, 09:03    A total of 63 hours and 14 minutes of EEG/Video telemetry was recorded.    ELECTROENCEPHALOGRAM   Interictal: normal background activity with posterior dominant rhythm of 9 Hz, symmetric; no inter-ictal epileptiform activity seen   Ictal: 7 typical seizures recorded - 4 from sleep (#1, 2, 6 and 7) and 3 while awake (# 3, 4 and 5), all consistent with right fronto-temporal onset    CLINICAL SEIZURE/EVENT   Classification: Localization-related epilepsy   Lateralization: Right   Localization: fronto-temporal region    Jammie Mcintyre MD, JAKE(), [facility].  Neurology-Epilepsy.        /RAMON  dd: 06/12/2017 15:32:11 (CDT)  td: 06/12/2017 23:55:45 (CDT)  Doc ID   #[id number]  Job ID #[id number]    CC:

## 2017-07-21 ENCOUNTER — TELEPHONE (OUTPATIENT)
Dept: NEUROLOGY | Facility: CLINIC | Age: 32
End: 2017-07-21

## 2017-07-21 NOTE — TELEPHONE ENCOUNTER
Returned the patient call about swelling around his incision site. I spoke to the nurse she stated he should go to the E.D. I advised the patient to go to the E.D.

## 2017-07-27 ENCOUNTER — OFFICE VISIT (OUTPATIENT)
Dept: NEUROLOGY | Facility: CLINIC | Age: 32
End: 2017-07-27
Payer: COMMERCIAL

## 2017-07-27 VITALS
DIASTOLIC BLOOD PRESSURE: 78 MMHG | HEIGHT: 71 IN | BODY MASS INDEX: 26.23 KG/M2 | HEART RATE: 96 BPM | WEIGHT: 187.38 LBS | SYSTOLIC BLOOD PRESSURE: 133 MMHG

## 2017-07-27 DIAGNOSIS — G40.219 LOCALIZATION-RELATED EPILEPSY WITH COMPLEX PARTIAL SEIZURES WITH INTRACTABLE EPILEPSY: Primary | ICD-10-CM

## 2017-07-27 DIAGNOSIS — Z51.81 ENCOUNTER FOR MEDICATION TITRATION: ICD-10-CM

## 2017-07-27 PROCEDURE — 99215 OFFICE O/P EST HI 40 MIN: CPT | Mod: S$GLB,,, | Performed by: PSYCHIATRY & NEUROLOGY

## 2017-07-27 PROCEDURE — 99999 PR PBB SHADOW E&M-EST. PATIENT-LVL III: CPT | Mod: PBBFAC,,, | Performed by: PSYCHIATRY & NEUROLOGY

## 2017-07-27 RX ORDER — LAMOTRIGINE 100 MG/1
100 TABLET ORAL
COMMUNITY
End: 2017-07-28 | Stop reason: DRUGHIGH

## 2017-07-27 RX ORDER — POLYETHYLENE GLYCOL 3350 17 G/17G
17 POWDER, FOR SOLUTION ORAL
COMMUNITY
Start: 2017-07-16

## 2017-07-27 RX ORDER — LACOSAMIDE 200 MG/1
200 TABLET ORAL
COMMUNITY

## 2017-07-27 RX ORDER — SENNOSIDES 8.6 MG/1
8.6 TABLET ORAL
COMMUNITY
Start: 2017-07-16

## 2017-07-27 RX ORDER — OXYCODONE AND ACETAMINOPHEN 5; 325 MG/1; MG/1
1-2 TABLET ORAL
COMMUNITY
Start: 2017-07-16

## 2017-07-27 RX ORDER — METHOCARBAMOL 750 MG/1
750 TABLET, FILM COATED ORAL
COMMUNITY
Start: 2017-07-16 | End: 2023-10-25 | Stop reason: SDUPTHER

## 2017-07-27 RX ORDER — DOCUSATE SODIUM 100 MG/1
100 CAPSULE, LIQUID FILLED ORAL
COMMUNITY
Start: 2017-07-16

## 2017-07-27 RX ORDER — ACETAMINOPHEN 325 MG/1
650 TABLET ORAL
COMMUNITY
Start: 2017-07-16

## 2017-07-27 RX ORDER — LAMOTRIGINE 25 MG/1
25 TABLET ORAL
COMMUNITY
End: 2017-09-06 | Stop reason: DRUGHIGH

## 2017-07-27 RX ORDER — LEVETIRACETAM 1000 MG/1
1000 TABLET ORAL
COMMUNITY
End: 2017-07-28 | Stop reason: SDUPTHER
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
## 2017-07-28 ENCOUNTER — OFFICE VISIT (OUTPATIENT)
Dept: INTERNAL MEDICINE | Facility: CLINIC | Age: 32
End: 2017-07-28
Payer: COMMERCIAL

## 2017-07-28 ENCOUNTER — TELEPHONE (OUTPATIENT)
Dept: NEUROLOGY | Facility: CLINIC | Age: 32
End: 2017-07-28

## 2017-07-28 VITALS
HEART RATE: 90 BPM | WEIGHT: 185.44 LBS | BODY MASS INDEX: 25.96 KG/M2 | DIASTOLIC BLOOD PRESSURE: 70 MMHG | SYSTOLIC BLOOD PRESSURE: 118 MMHG | OXYGEN SATURATION: 98 % | HEIGHT: 71 IN

## 2017-07-28 DIAGNOSIS — Z48.02 VISIT FOR SUTURE REMOVAL: Primary | ICD-10-CM

## 2017-07-28 DIAGNOSIS — G40.219 LOCALIZATION-RELATED EPILEPSY WITH COMPLEX PARTIAL SEIZURES WITH INTRACTABLE EPILEPSY: ICD-10-CM

## 2017-07-28 PROCEDURE — 99999 PR PBB SHADOW E&M-EST. PATIENT-LVL III: CPT | Mod: PBBFAC,,, | Performed by: NURSE PRACTITIONER

## 2017-07-28 PROCEDURE — 99203 OFFICE O/P NEW LOW 30 MIN: CPT | Mod: S$GLB,,, | Performed by: NURSE PRACTITIONER

## 2017-07-28 NOTE — TELEPHONE ENCOUNTER
Message printed for Dr. Mcintyre to advise     --- Message from Kushal Singletary sent at 7/28/2017  9:19 AM CDT -----  Contact: Self [phone]  Patient is requesting a return call regarding the removal of the stitches and scheduling a tele-conference, pls call

## 2017-07-28 NOTE — PROGRESS NOTES
INTERNAL MEDICINE URGENT CARE NOTE    CHIEF COMPLAINT     Chief Complaint   Patient presents with    Suture / Staple Removal     brain surgery last week       HPI     Alexx Rothman is a 31 y.o. male who presents for an urgent visit today. Pt is s/p right temporal lobectomy with amygdalohippocampectomy at the [facility] on 7/14/2017 for partial idiopathic epilespy with seizures of localized onset, intractable. Was told to have sutures removed in 7-10 days.   Denies drainage or fever or chills. Mild swelling and pain over the 1st week or so but resolved with rest and tylenol. Showering with baby shampoo.     Compliant with keppra, vimpat and lamictal.   F/u with neurosurgery 8/30/2017  Was seen by neurology here at [facility] yesterday.     Past Medical History:  Past Medical History:   Diagnosis Date    Seizures        Home Medications:  Prior to Admission medications    Medication Sig Start Date End Date Taking? Authorizing Provider   acetaminophen (TYLENOL) 325 MG tablet Take 650 mg by mouth. 7/16/17   Historical Provider, MD   docusate sodium (COLACE) 100 MG capsule Take 100 mg by mouth. 7/16/17   Historical Provider, MD   lacosamide (VIMPAT) 200 mg Tab Take 200 mg by mouth.    Historical Provider, MD   lamotrigine (LAMICTAL) 100 MG tablet Take 100 mg by mouth 2 (two) times daily.    Historical Provider, MD   lamotrigine (LAMICTAL) 100 MG tablet Take 100 mg by mouth.    Historical Provider, MD   lamotrigine (LAMICTAL) 25 MG tablet Take 1 tablet (25 mg total) by mouth 2 (two) times daily. 5/12/17   Yee Campbell PA-C   lamotrigine (LAMICTAL) 25 MG tablet Take 25 mg by mouth.    Historical Provider, MD   levetiracetam (KEPPRA) 1000 MG tablet Take 1 tablet (1,000 mg total) by mouth 2 (two) times daily. 5/12/17 5/12/18  Felicitas Kay PA-C   levetiracetam (KEPPRA) 1000 MG tablet Take 1,000 mg by mouth.    Historical Provider, MD   methocarbamol (ROBAXIN) 750 MG Tab Take 750 mg by mouth. 7/16/17    Historical Provider, MD   oxycodone-acetaminophen (PERCOCET) 5-325 mg per tablet Take 1-2 tablets by mouth. 7/16/17   Historical Provider, MD   polyethylene glycol (GLYCOLAX) 17 gram PwPk Take 17 g by mouth. 7/16/17   Historical Provider, MD   senna (SENOKOT) 8.6 mg tablet Take 8.6 mg by mouth. 7/16/17   Historical Provider, MD   VIMPAT 200 mg Tab Take 200 mg by mouth 2 (two) times daily.  3/31/17   Historical Provider, MD       Review of Systems:  Review of Systems   Constitutional: Negative for chills, fatigue and fever.   Respiratory: Negative for shortness of breath.    Cardiovascular: Negative for chest pain and palpitations.   Gastrointestinal: Negative for abdominal pain.   Skin: Positive for wound.   Neurological: Positive for headaches. Negative for dizziness, weakness and light-headedness.       Health Maintainence:   Immunizations:  Health Maintenance       Date Due Completion Date    Lipid Panel 1985 ---    Influenza Vaccine 08/01/2017 ---    TETANUS VACCINE 02/23/2027 2/23/2017           PHYSICAL EXAM     There were no vitals taken for this visit.    Physical Exam   Constitutional: He is oriented to person, place, and time. He appears well-developed and well-nourished.   HENT:   Head: Normocephalic and atraumatic.       Eyes: Pupils are equal, round, and reactive to light.   Cardiovascular: Normal rate and regular rhythm.    Pulmonary/Chest: Effort normal.   Neurological: He is alert and oriented to person, place, and time.   Psychiatric: He has a normal mood and affect.   Vitals reviewed.      LABS     No results found for: LABA1C, HGBA1C  CMP  Sodium   Date Value Ref Range Status   05/09/2017 141 136 - 145 mmol/L Final     Potassium   Date Value Ref Range Status   05/09/2017 3.7 3.5 - 5.1 mmol/L Final     Chloride   Date Value Ref Range Status   05/09/2017 104 95 - 110 mmol/L Final     CO2   Date Value Ref Range Status   05/09/2017 26 23 - 29 mmol/L Final     Glucose   Date Value Ref Range  Status   05/09/2017 96 70 - 110 mg/dL Final     BUN, Bld   Date Value Ref Range Status   05/09/2017 13 6 - 20 mg/dL Final     Creatinine   Date Value Ref Range Status   05/09/2017 1.0 0.5 - 1.4 mg/dL Final     Calcium   Date Value Ref Range Status   05/09/2017 9.2 8.7 - 10.5 mg/dL Final     Total Protein   Date Value Ref Range Status   05/09/2017 7.4 6.0 - 8.4 g/dL Final     Albumin   Date Value Ref Range Status   05/09/2017 4.2 3.5 - 5.2 g/dL Final     Total Bilirubin   Date Value Ref Range Status   05/09/2017 0.3 0.1 - 1.0 mg/dL Final     Comment:     For infants and newborns, interpretation of results should be based  on gestational age, weight and in agreement with clinical  observations.  Premature Infant recommended reference ranges:  Up to 24 hours.............<8.0 mg/dL  Up to 48 hours............<12.0 mg/dL  3-5 days..................<15.0 mg/dL  6-29 days.................<15.0 mg/dL       Alkaline Phosphatase   Date Value Ref Range Status   05/09/2017 84 55 - 135 U/L Final     AST   Date Value Ref Range Status   05/09/2017 26 10 - 40 U/L Final     ALT   Date Value Ref Range Status   05/09/2017 31 10 - 44 U/L Final     Anion Gap   Date Value Ref Range Status   05/09/2017 11 8 - 16 mmol/L Final     eGFR if    Date Value Ref Range Status   05/09/2017 >60.0 >60 mL/min/1.73 m^2 Final     eGFR if non    Date Value Ref Range Status   05/09/2017 >60.0 >60 mL/min/1.73 m^2 Final     Comment:     Calculation used to obtain the estimated glomerular filtration  rate (eGFR) is the CKD-EPI equation. Since race is unknown   in our information system, the eGFR values for   -American and Non--American patients are given   for each creatinine result.       Lab Results   Component Value Date    WBC 7.72 05/09/2017    HGB 16.2 05/09/2017    HCT 47.3 05/09/2017    MCV 91 05/09/2017     05/09/2017     No results found for: CHOL  No results found for: HDL  No results found for:  LDLCALC  No results found for: TRIG  No results found for: CHOLHDL  Lab Results   Component Value Date    TSH 2.597 05/09/2017       ASSESSMENT/PLAN     Alexx Augie Rothman is a 31 y.o. male with  Past Medical History:   Diagnosis Date    Seizures      Visit for suture removal- sutures removed without complications.     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 Cont current meds. F/u as needed         Follow up as needed     Patient education provided from [facility]. Patient was counseled on when and how to seek emergent care.       Jennifer GUERRIER, APRN, FNP-c   Department of Internal Medicine - [facility]  2:08 PM

## 2017-07-31 PROBLEM — Z51.81 ENCOUNTER FOR MEDICATION TITRATION: Status: ACTIVE | Noted: 2017-07-31

## 2017-07-31 NOTE — ASSESSMENT & PLAN NOTE
32yo M with hx of seizures since 2007;  who was last seen by me in clinic on 6/5//2017        Patient subsequently visited [facility] and had resective epilepsy surgery: Right temporal lobectomy with amygdalohippocampectomy done on 7/14/17    Current AEDs:  Lacosamide 200mg bid  Levetiracetam 1000mg bid - having severe behavioral side-effects  Lamotrigine 125mg bid    Plan:  1) Taper off Levetiracetam: decrease to 500mg bid > 250mg bid > d/c   - typically no AED adjustments done this early post-op: however severe behavioral side-effects warrant d/c   - had detailed discussion with patient (and wife - on phone): they are very reluctant to do so: will contact  at [facility] as well and make a final decision after   2) Increase  bid  3) Continue Lacosamide 200mg bid

## 2017-08-02 ENCOUNTER — TELEPHONE (OUTPATIENT)
Dept: NEUROLOGY | Facility: CLINIC | Age: 32
End: 2017-08-02

## 2017-08-02 NOTE — TELEPHONE ENCOUNTER
I called [facility] at [phone] and spoke w/lizy. She will have Dr. Shepherd send an order to have home health remove sutures

## 2017-08-03 ENCOUNTER — TELEPHONE (OUTPATIENT)
Dept: NEUROLOGY | Facility: CLINIC | Age: 32
End: 2017-08-03

## 2017-08-03 ENCOUNTER — LAB VISIT (OUTPATIENT)
Dept: LAB | Facility: HOSPITAL | Age: 32
End: 2017-08-03
Attending: PSYCHIATRY & NEUROLOGY
Payer: COMMERCIAL

## 2017-08-03 DIAGNOSIS — Z51.81 ENCOUNTER FOR MEDICATION TITRATION: ICD-10-CM

## 2017-08-03 DIAGNOSIS — G40.219 LOCALIZATION-RELATED EPILEPSY WITH COMPLEX PARTIAL SEIZURES WITH INTRACTABLE EPILEPSY: ICD-10-CM

## 2017-08-03 PROCEDURE — 36415 COLL VENOUS BLD VENIPUNCTURE: CPT

## 2017-08-03 PROCEDURE — 80175 DRUG SCREEN QUAN LAMOTRIGINE: CPT

## 2017-08-03 NOTE — TELEPHONE ENCOUNTER
pt had sutures removed at [facility] last week. he is doing well. there is some scabbing, no redness, swelling, or drainage. he will call us for any concerns

## 2017-08-03 NOTE — TELEPHONE ENCOUNTER
i received outside order from surgeon for suture removal. QUINTON short/tania at [facility], faxed orders and clinic notes and my direct number

## 2017-08-04 LAB — LAMOTRIGINE SERPL-MCNC: 3.4 UG/ML (ref 2–15)

## 2017-08-07 ENCOUNTER — TELEPHONE (OUTPATIENT)
Dept: NEUROLOGY | Facility: CLINIC | Age: 32
End: 2017-08-07

## 2017-08-07 NOTE — TELEPHONE ENCOUNTER
----- Message from Kushal Singletary sent at 8/7/2017  8:45 AM CDT -----  Contact: self [phone]  Patient is requesting a returrn call about the results of the  blood work and is constant supervision still necessary while in post op, pls call to update

## 2017-08-08 ENCOUNTER — PATIENT MESSAGE (OUTPATIENT)
Dept: NEUROLOGY | Facility: CLINIC | Age: 32
End: 2017-08-08

## 2017-08-08 DIAGNOSIS — R56.9 SEIZURES: Primary | ICD-10-CM

## 2017-08-08 NOTE — TELEPHONE ENCOUNTER
----- Message from Adriane Beckford sent at 8/8/2017 12:49 PM CDT -----  Contact: self @ [phone]  Pt says he is returning Dr Mcintyre's call.

## 2017-08-08 NOTE — TELEPHONE ENCOUNTER
I spoke w/pt's wife. She states he is a little better, but not 100% back to his old self. They are already on lamictal 200mg BID, but the level was drawn only a few days afterwards. So Dr. Mcintyre wants to repeat the level in one week.     He is to drop his keppra to 250 once a day for 3 days and stop on Friday.      She also questions whether he can be left alone while meds are being changed. Of course the ideal situation would be to constantly observe the patient, but I reminded her of the warnings for epilepsy patients: no baths, swimming, driving, operating any dangerous equipment, or climbing ladders. She understands the plan of care

## 2017-08-09 ENCOUNTER — TELEPHONE (OUTPATIENT)
Dept: NEUROLOGY | Facility: CLINIC | Age: 32
End: 2017-08-09

## 2017-08-09 NOTE — TELEPHONE ENCOUNTER
----- Message from Thang Barriga sent at 8/9/2017  1:50 PM CDT -----  Contact: Patient   Patient called in inquiring about his appointment on 8/30 @ 1PM however it doesn't show in our records nor did he receive an email about it. It was a teleconference appointment with The neurologist and the neuro surgeon. Please contact Patient ASAP [phone].

## 2017-08-15 ENCOUNTER — LAB VISIT (OUTPATIENT)
Dept: LAB | Facility: HOSPITAL | Age: 32
End: 2017-08-15
Attending: PSYCHIATRY & NEUROLOGY
Payer: COMMERCIAL

## 2017-08-15 DIAGNOSIS — R56.9 SEIZURES: ICD-10-CM

## 2017-08-15 PROCEDURE — 80175 DRUG SCREEN QUAN LAMOTRIGINE: CPT

## 2017-08-15 PROCEDURE — 36415 COLL VENOUS BLD VENIPUNCTURE: CPT

## 2017-08-17 ENCOUNTER — TELEPHONE (OUTPATIENT)
Dept: NEUROLOGY | Facility: CLINIC | Age: 32
End: 2017-08-17

## 2017-08-17 LAB — LAMOTRIGINE SERPL-MCNC: 2.7 UG/ML (ref 2–15)

## 2017-08-17 NOTE — TELEPHONE ENCOUNTER
Spoke to pt who will forward teleconference information for Dr Mcintyre. Planned phone conference 8/30 at 1pm/advised pt I will have to confirm with Dr Mcintyre. Pt verbalized understanding.

## 2017-08-17 NOTE — TELEPHONE ENCOUNTER
----- Message from Kushal Singletary sent at 8/17/2017 10:35 AM CDT -----  Contact: Patient [phone]  Patient is calling to confirm the teleconference call on 8-30 between himself, Dr. Mcintyre and the neurologist in [place], pls call to confirm

## 2017-08-28 ENCOUNTER — TELEPHONE (OUTPATIENT)
Dept: NEUROLOGY | Facility: CLINIC | Age: 32
End: 2017-08-28

## 2017-08-28 NOTE — TELEPHONE ENCOUNTER
I called pt to verify dose of lamotrigine, he is taking 100mg BID. I discussed the noted dose in Dr. Mcintyre's chart. He stated that he is doing well on this dose. I instructed him to discuss it with Dr. Mcintyre when he comes in for clinic visit tomorrow. I called in 100mg BID w/11 refills

## 2017-08-28 NOTE — TELEPHONE ENCOUNTER
Received notification from Pharmacy that Patient needs Prior Authorization for his Lamictal.When [facility].

## 2017-08-29 NOTE — TELEPHONE ENCOUNTER
Called Pharmacy after receiving message concerning PA for lamotrigine-spoke to covermymeds-and they suggested Pharmacy run it again-they did using a different  and it went through.

## 2017-08-30 ENCOUNTER — TELEPHONE (OUTPATIENT)
Dept: NEUROLOGY | Facility: CLINIC | Age: 32
End: 2017-08-30

## 2017-08-30 NOTE — TELEPHONE ENCOUNTER
----- Message from Kushal Singletary sent at 8/30/2017 12:28 PM CDT -----  Contact: Geri  ( spouse ) @ [phone]  Caller is returning a missed call from kathrin Peralta return call

## 2017-08-30 NOTE — TELEPHONE ENCOUNTER
Spoke to pt who stated he had drain removed in back of skull 7/14 after craniotomy at [facility]. Pt states several stiches at site have not dissolved and need to be taken out. 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 Advised pt he will need to be seen again at urgent care to have stitches assessed. Will update pt with appt. Pt verbalized understanding.

## 2017-08-30 NOTE — TELEPHONE ENCOUNTER
----- Message from Adriane Beckford sent at 8/30/2017  1:39 PM CDT -----  Contact: self @ [phone]  Calling to request a f/u appt to have stitches removed.  Would like to speak with dr sanabria concerning what happened with the [facility] since dr sanabria was not able to join the conference call.  pls call asap.

## 2017-09-01 ENCOUNTER — TELEPHONE (OUTPATIENT)
Dept: NEUROLOGY | Facility: CLINIC | Age: 32
End: 2017-09-01

## 2017-09-01 NOTE — TELEPHONE ENCOUNTER
Spoke to Pt he state understanding he needs to schedule an appt with Internal Med Pt was given [phone] to schedule an appt for his stiches to be removed.         ---- Message from Kathryn Gomes RN sent at 9/1/2017 12:57 PM CDT -----  Contact: PT      ----- Message -----  From: David Montgomery  Sent: 9/1/2017  12:40 PM  To: Miguelina Agudelo Staff    PT would like a call regarding scheduling an appt to remove his stiches.      Call [phone]

## 2017-09-06 ENCOUNTER — OFFICE VISIT (OUTPATIENT)
Dept: INTERNAL MEDICINE | Facility: CLINIC | Age: 32
End: 2017-09-06
Payer: COMMERCIAL

## 2017-09-06 VITALS
SYSTOLIC BLOOD PRESSURE: 98 MMHG | HEART RATE: 69 BPM | TEMPERATURE: 98 F | HEIGHT: 71 IN | BODY MASS INDEX: 28.89 KG/M2 | OXYGEN SATURATION: 97 % | WEIGHT: 206.38 LBS | DIASTOLIC BLOOD PRESSURE: 72 MMHG

## 2017-09-06 DIAGNOSIS — Z48.02 VISIT FOR SUTURE REMOVAL: Primary | ICD-10-CM

## 2017-09-06 PROCEDURE — 99999 PR PBB SHADOW E&M-EST. PATIENT-LVL III: CPT | Mod: PBBFAC,,, | Performed by: INTERNAL MEDICINE

## 2017-09-06 PROCEDURE — 99213 OFFICE O/P EST LOW 20 MIN: CPT | Mod: S$GLB,,, | Performed by: INTERNAL MEDICINE

## 2017-09-06 PROCEDURE — 3008F BODY MASS INDEX DOCD: CPT | Mod: S$GLB,,, | Performed by: INTERNAL MEDICINE

## 2017-09-06 RX ORDER — LAMOTRIGINE 100 MG/1
TABLET ORAL
Refills: 0 | COMMUNITY
Start: 2017-08-03

## 2017-09-06 NOTE — PROGRESS NOTES
Subjective:       Patient ID: Alexx Rothman is a 32 y.o. male.    Chief Complaint: Suture / Staple Removal (07/14 )    HPI     Patient is a 32 year old male here today for suture removal.  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 He had initial sutures moved by internal medicine urgent care clinic on 7/28/17 and was told by his surgeon that he had dissolvable sutures around the drainage tube site which was also removed on 7/14/17 but reports that he can still feel suture is present. Denies fever/chills, tenderness, drainage. No pain over associated area. Area he is referred to is over R scalp.    Review of Systems   Constitutional: Negative for chills and fever.   Skin:        See HPI       Objective:      Physical Exam   Constitutional: He is oriented to person, place, and time. He appears well-developed and well-nourished. No distress.   HENT:   Head: Normocephalic and atraumatic.   Neurological: He is alert and oriented to person, place, and time.   Skin: Skin is warm and dry. He is not diaphoretic.   One suture remaining over the R scalp, with some crusting. No swelling, erythema or drainage noted. No TTP in the affected area.   Nursing note and vitals reviewed.      Assessment:       1. Visit for suture removal        Plan:       One suture removed without any complications  RTC PRN

## 2017-11-08 ENCOUNTER — TELEPHONE (OUTPATIENT)
Dept: NEUROLOGY | Facility: CLINIC | Age: 32
End: 2017-11-08

## 2017-11-08 NOTE — TELEPHONE ENCOUNTER
----- Message from Kai Nieves sent at 11/8/2017  4:01 PM CST -----  Contact: Self @ [phone]  Pt is asking for refill on VIMPAT 200 mg Tab. Pt says the pharmacy has tried to contact the office but hasn't received a response. Pt is asking it be refilled today bc he's out of the medication.    [facility] "[employer]" [address] [address] [place] [address] AT [facility] [address]  [address]  [facility] [address]  Phone: [phone] Fax: [phone]

## 2018-05-16 ENCOUNTER — NURSE TRIAGE (OUTPATIENT)
Dept: ADMINISTRATIVE | Facility: CLINIC | Age: 33
End: 2018-05-16

## 2018-05-17 ENCOUNTER — TELEPHONE (OUTPATIENT)
Dept: NEUROLOGY | Facility: CLINIC | Age: 33
End: 2018-05-17

## 2018-05-17 NOTE — TELEPHONE ENCOUNTER
Called in refills to [facility] for Vimpat 200mg. Notified patient should make follow up appointment

## 2018-05-17 NOTE — TELEPHONE ENCOUNTER
Reason for Disposition   Caller requesting a NON-URGENT new prescription or refill and triager unable to refill per unit policy    Protocols used: ST MEDICATION QUESTION CALL-A-    Alexx called to say he will be out of his Vimpat tomorrow.  He is requesting refill from Dr Jammie Mcintyre, his neurologist, as soon as possible after opening of clinic Thursday, 05/17/18.  The last order for Vimpat, 200 mg tablets, take one tablet (200 mg total) twice a day, dispense 60, was issued by Dr Mcintyre on 11/08/17, and with 5 additional refills at that time (per chart note 11/08/17).  Alexx says the medication has not changed since he was in [facility] under the care of Dr Phani Painting. Message to Dr Mcintyre with request to refill Vimpat for Alexx asap. His preferred pharmacy has been verified and updated in Epic.  Please contact caller directly with any additional care advice at [phone].

## 2018-05-17 NOTE — TELEPHONE ENCOUNTER
----- Message from Kushal Singletary sent at 5/17/2018  9:28 AM CDT -----  Contact: Pharmacy @ [phone]  Caller is calling to get an update on the medication refill request for ( VIMPAT 200 mg Tab ) pt is out of medication     [facility] [address] [place] [address] AT [facility] [address]  [facility] [address]  Phone: [phone] Fax: [phone]

## 2018-06-11 ENCOUNTER — TELEPHONE (OUTPATIENT)
Dept: NEUROLOGY | Facility: CLINIC | Age: 33
End: 2018-06-11

## 2018-06-11 NOTE — TELEPHONE ENCOUNTER
----- Message from Kushal Singletary sent at 6/11/2018  2:20 PM CDT -----  Contact: Patient @ [phone]  Patient is calling to schedule a f/u appt, pt states he needs to be seen before July ( which was the next available )  Because he needs a medication refill on the ( lacosamide (VIMPAT) 200 mg Tab   ) and will be out by next week, pls call

## 2020-04-14 RX ORDER — LACOSAMIDE 200 MG/1
200 TABLET ORAL
Status: CANCELLED | OUTPATIENT
Start: 2020-04-14

## 2020-04-14 NOTE — TELEPHONE ENCOUNTER
Spoke to Patient concerning his Vimpat.Inquired if he has a PCP as he has not been seen in clinic since 2017.He has contacted his treatment team at [facility] for refill authorization.He is interested in a virtual visit.He was informed that staff will contact him for scheduling.

## 2020-04-15 ENCOUNTER — TELEPHONE (OUTPATIENT)
Dept: NEUROLOGY | Facility: CLINIC | Age: 35
End: 2020-04-15

## 2020-04-15 NOTE — TELEPHONE ENCOUNTER
Called and spoke to Patient on 4/14 and he states he will call [facility] for refill on his medication.He states he did get his Vimpat from them.He would like to schedule a virtual visit with Dr Mcintyre as he has not been seen 7/27/17.Informed him that staff will contact him for appointment set up.

## 2020-07-07 NOTE — H&P
Anesthesia Pre Eval Note    Anesthesia ROS/Med Hx    Overall Review:  EKG was reviewed     Anesthetic Complication History:  Patient does not have a history of anesthetic complications      Pulmonary Review:  Patient does not have a pulmonary history      Neuro/Psych Review:    Positive for psychiatric history - Anxiety    Cardiovascular Review:  Exercise tolerance: good (>4 METS)  Positive for hypertension - well controlled    GI/HEPATIC/RENAL Review:  Patient does not have a GI/hepatic/renalhistory       End/Other Review:  Patient does not have an endo/other history      Overall Review of Systems Comments:  Current smoker. Educated on cessation. Will try to quit post op  Additional Results:     ALLERGIES:   -- Penicillins -- HIVES       Lab Results       Component                Value               Date                       WBC                      7.9                 01/08/2015                 RBC                      4.83                01/08/2015                 MCHC                     34.9                09/14/2018                 MCHC                     33.1                01/08/2015                 SODIUM                   137                 09/14/2018                 SODIUM                   138                 01/08/2015                 POTASSIUM                3.9                 09/14/2018                 POTASSIUM                4.8                 01/08/2015                 CHLORIDE                 102                 09/14/2018                 CO2                      28                  01/08/2015                 GLUCOSE                  94                  09/14/2018                 GLUCOSE                  91                  01/08/2015                 BUN                      13                  01/08/2015                 CALCIUM                  9.9                 09/14/2018                 PLT                      293                 01/08/2015                 PTT                      30     [facility] Medicine  History & Physical    Patient Name: Alexx Rothman  MRN: [id number]  Admission Date: 5/9/2017  Attending Physician: Buddy Chua MD   Primary Care Provider: [facility] Medicine Team: [facility] MED E Yee Campbell PA-C     Patient information was obtained from patient, past medical records and ER records.     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     HPI: 31 year old male with past medical history of seizures since Nov 2007. He is direct admission to Epilepsy Monitoring Unit (EMU) for further evaluation of convulsions. Per patient, his last episode was Feb 2017 and events described as nonsensical speech or screaming followed by turning head to the Left and then full body convulsions. He reports episodes occur while sleeping right before he wakes up and have occasional associated bladder incontinence. He endorses 4 episodes in the last year but initially would occur every couple of month. Patient is compliant with home AEDs and follows with Neuro Epilepsy (see full clinic note 4/20/17 for further details). Denies fevers, chills, sweats, recent illness, N/V, abdominal pain, or weight changes. He endorses mild nasal congestion and sore throat. He is a current daily marijuana user and former tobacco user of 1 pack per week but quit 2 months ago.     Admitted to hospital medicine for further management.       Past Medical History:   Diagnosis Date    Seizures        Past Surgical History:   Procedure Laterality Date    SHOULDER SURGERY         Review of patient's allergies indicates:  No Known Allergies    No current facility-administered medications on file prior to encounter.      Current Outpatient Prescriptions on File Prior to Encounter   Medication Sig    lacosamide 100 mg Tab Take 1 tablet (100 mg total) by                2014                 INR                      1.0                 2014               Past Medical History:  No date: Anxiety  No date: Bipolar 1 disorder (CMS/HCC)  No date: Body piercing      Comment:  metal bar, left face, cannot be removed  No date: Cardiac murmur      Comment:  no s&s  No date: Depression  No date: Essential (primary) hypertension  No date: Heavy menses      Comment:  often twice monthly  2012: Kidney stone  No date: Marijuana smoker      Comment:  smokes 0.25-0.5gm daily (\"1-2 bowls\")  No date: Stress incontinence  No date: Uterovaginal prolapse    Past Surgical History:  No date:  section, classic  2017: Mass excision; Right      Comment:  benign, groin  2008: Shoulder surgery; Right      Comment:  torn rotator cuff repair       Prior to Admission medications :  Medication escitalopram (LEXAPRO) 10 MG tablet, Sig Take 1 tablet by mouth daily., Start Date 19, End Date , Taking? Yes, Authorizing Provider Atilio Alex MD    Medication lithium 300 MG capsule, Sig Take 1 capsule by mouth 3 times daily (with meals).  Patient taking differently: Take 300 mg by mouth 2 times daily. 2 caps in am and 1 cap in evening, Start Date 19, End Date , Taking? Yes, Authorizing Provider Atilio Alex MD    Medication propranolol (INDERAL) 10 MG tablet, Sig Take 1 tablet by mouth daily., Start Date 19, End Date , Taking? Yes, Authorizing Provider Atilio Alex MD    Medication fluticasone (FLONASE) 50 MCG/ACT nasal spray, Sig Spray in each nostril as needed. , Start Date 18, End Date , Taking? Yes, Authorizing Provider Outside Provider    Medication nicotine (NICODERM) 14 MG/24HR patch, Sig Place 1 patch onto the skin every 24 hours., Start Date 20, End Date 20, Taking? , Authorizing Provider Atilio Alex MD    Medication diphenoxylate-atropine (LOMOTIL) 2.5-0.025 MG tablet, Sig Take 1 tablet by mouth 4 times daily as needed for Diarrhea.,  mouth 2 (two) times daily.    lamotrigine (LAMICTAL) 25 MG tablet 25 mg.    levetiracetam (KEPPRA) 750 MG Tab Take 1 tablet (750 mg total) by mouth 2 (two) times daily.    phenytoin (DILANTIN) 100 MG ER capsule Take 1 capsule (100 mg total) by mouth 4 (four) times daily.    VIMPAT 200 mg Tab 200 mg.    [DISCONTINUED] lamotrigine (LAMICTAL) 100 MG tablet Take 1 tablet (100 mg total) by mouth 2 (two) times daily.     Family History     Problem Relation (Age of Onset)    Diabetes Maternal Grandfather    Heart disease Father        Social History Main Topics    Smoking status: Former Smoker     Packs/day: 0.10     Types: Cigarettes    Smokeless tobacco: Not on file    Alcohol use 0.5 oz/week     1 Standard drinks or equivalent per week    Drug use: 7.00 per week     Special: Marijuana    Sexual activity: Yes     Partners: Female     Review of Systems   Constitutional: Negative for activity change, appetite change, chills, fatigue and fever.   HENT: Positive for congestion and sore throat. Negative for rhinorrhea and sinus pressure.    Eyes: Negative for pain, redness and visual disturbance.   Respiratory: Negative for cough, chest tightness, shortness of breath, wheezing and stridor.    Cardiovascular: Negative for chest pain, palpitations and leg swelling.   Gastrointestinal: Negative for abdominal distention, abdominal pain, blood in stool, constipation, diarrhea, nausea and vomiting.   Endocrine: Negative for cold intolerance and heat intolerance.   Genitourinary: Negative for dysuria, frequency, hematuria and urgency.   Musculoskeletal: Negative for arthralgias, back pain, myalgias and neck pain.   Skin: Negative for color change, pallor and rash.   Neurological: Positive for seizures. Negative for dizziness, tremors, syncope, weakness, numbness and headaches.   Hematological: Does not bruise/bleed easily.   Psychiatric/Behavioral: Negative for agitation, confusion and hallucinations. The patient is not  Start Date 8/2/19, End Date 7/7/20, Taking? , Authorizing Provider Atilio Alex MD            Relevant Problems   No relevant active problems       Physical Exam     Airway   Mallampati: II  TM Distance: >3 FB  Neck ROM: Full  Neck: Non-tender and Able to place in sniff position  TMJ Mobility: Good    Cardiovascular  Cardiovascular exam normal  Cardio Rhythm: Regular  Cardio Rate: Normal    Head Assessment  Head assessment: Normocephalic and Atraumatic    General Assessment  General Assessment: Alert and oriented and No acute distress    Dental Exam  Dental exam normal  Dental Note: Chipped upper incisor, multiple fillings / caries    Pulmonary Exam  Pulmonary exam normal  Breath sounds clear to auscultation:  Yes      Anesthesia Plan    ASA Status: 2    Anesthesia Type: General    Induction: Intravenous  Preferred Airway Type: ETT  Maintenance: Inhalational      Risks Discussed: Nausea, Vomiting, Dental Injury, Headache, Sore Throat, Hypotension, Allergic Reaction and Intra-operative Awareness    Post-op Pain Management: Per Surgeon      Checklist  Reviewed: NPO Status, Allergies, Medications, Problem list, Past Med History, Lab Results and EKG    Informed Consent  The proposed anesthetic plan, including its risks and benefits, have been discussed with the Patient  - along with the risks and benefits of alternatives.  Questions were encouraged and answered and the patient and/or representative understands and agrees to proceed.     Blood Products: Not Anticipated     nervous/anxious.      Objective:     Vital Signs (Most Recent):  Temp: 98.9 °F (37.2 °C) (05/09/17 1500)  Pulse: 65 (05/09/17 1500)  Resp: 18 (05/09/17 1500)  BP: 121/71 (05/09/17 1500)  SpO2: 98 % (05/09/17 1500) Vital Signs (24h Range):  Temp:  [98.9 °F (37.2 °C)] 98.9 °F (37.2 °C)  Pulse:  [65] 65  Resp:  [18] 18  SpO2:  [98 %] 98 %  BP: (121)/(71) 121/71     Weight: 86.1 kg (189 lb 14.4 oz)  Body mass index is 26.49 kg/(m^2).    Physical Exam   Constitutional: He is oriented to person, place, and time. He appears well-developed and well-nourished. No distress.   HENT:   Head: Normocephalic and atraumatic.   Mouth/Throat: Oropharynx is clear and moist.   Eyes: Conjunctivae and EOM are normal. Pupils are equal, round, and reactive to light. No scleral icterus.   Neck: Normal range of motion. Neck supple. No JVD present. No tracheal deviation present. No thyromegaly present.   Cardiovascular: Normal rate, regular rhythm and intact distal pulses.  Exam reveals no gallop and no friction rub.    No murmur heard.  Pulmonary/Chest: Effort normal and breath sounds normal. No respiratory distress. He has no wheezes. He has no rales.   Abdominal: Soft. Bowel sounds are normal. He exhibits no distension and no mass. There is no tenderness. There is no rebound and no guarding.   Musculoskeletal: Normal range of motion. He exhibits no edema.   Neurological: He is alert and oriented to person, place, and time. He displays normal reflexes. No cranial nerve deficit.   Skin: Skin is warm and dry. No rash noted. No erythema.   Psychiatric: He has a normal mood and affect. His behavior is normal.        Significant Labs:   CBC:   Recent Labs  Lab 05/09/17  1539   WBC 7.72   HGB 16.2   HCT 47.3        CMP:   Recent Labs  Lab 05/09/17  1539      K 3.7      CO2 26   GLU 96   BUN 13   CREATININE 1.0   CALCIUM 9.2   PROT 7.4   ALBUMIN 4.2   BILITOT 0.3   ALKPHOS 84   AST 26   ALT 31   ANIONGAP 11   EGFRNONAA >60.0      All pertinent labs within the past 24 hours have been reviewed.    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

## 2021-04-30 NOTE — TELEPHONE ENCOUNTER
-- DO NOT REPLY / DO NOT REPLY ALL --  -- Message is from the [facility]--    COVID-19 Universal Screening: N/A - Not about scheduling    General Patient Message      Reason for Call: The patient will like to have something for a yeast infection, the patient states that she have not been sexually active and she feels that she may have some irration from it. Can you send something to the pharmacy for the patient     Caller Information       Type Contact Phone    04/30/2021 10:55 AM CDT Phone (Incoming) Kezia Guerra (Self) [phone] (H)          Alternative phone number: n/a    Turnaround time given to caller:   \"This message will be sent to [state Provider's name]. The clinical team will fulfill your request as soon as they review your message.\"     Called [facility] and inquired if Patient needed a prior authorization.Was informed he needs new RX called in.Called in earlier by VIANNEY Cervantes RN

## 2021-09-08 ENCOUNTER — TELEPHONE (OUTPATIENT)
Dept: NEUROLOGY | Facility: CLINIC | Age: 36
End: 2021-09-08

## 2021-10-05 VITALS
SYSTOLIC BLOOD PRESSURE: 133 MMHG | HEIGHT: 71 IN | OXYGEN SATURATION: 97 % | HEART RATE: 96 BPM | TEMPERATURE: 98 F | BODY MASS INDEX: 26.6 KG/M2 | RESPIRATION RATE: 18 BRPM | WEIGHT: 190 LBS | DIASTOLIC BLOOD PRESSURE: 71 MMHG

## 2021-10-05 PROCEDURE — 12002 PR RESUP NPTERF WND BODY 2.6-7.5 CM: ICD-10-PCS | Mod: ,,, | Performed by: EMERGENCY MEDICINE

## 2021-10-05 PROCEDURE — 12002 RPR S/N/AX/GEN/TRNK2.6-7.5CM: CPT | Mod: ,,, | Performed by: EMERGENCY MEDICINE

## 2021-10-05 PROCEDURE — 99284 PR EMERGENCY DEPT VISIT,LEVEL IV: ICD-10-PCS | Mod: 25,,, | Performed by: EMERGENCY MEDICINE

## 2021-10-05 PROCEDURE — 99283 EMERGENCY DEPT VISIT LOW MDM: CPT | Mod: 25

## 2021-10-05 PROCEDURE — 12002 RPR S/N/AX/GEN/TRNK2.6-7.5CM: CPT

## 2021-10-05 PROCEDURE — 99284 EMERGENCY DEPT VISIT MOD MDM: CPT | Mod: 25,,, | Performed by: EMERGENCY MEDICINE

## 2021-10-06 ENCOUNTER — HOSPITAL ENCOUNTER (EMERGENCY)
Facility: HOSPITAL | Age: 36
Discharge: HOME | End: 2021-10-06
Attending: EMERGENCY MEDICINE
Payer: MEDICAID

## 2021-10-06 DIAGNOSIS — R11.0 NAUSEA: ICD-10-CM

## 2021-10-06 DIAGNOSIS — S61.419A LACERATION OF HAND WITHOUT FOREIGN BODY, UNSPECIFIED LATERALITY, INITIAL ENCOUNTER: Primary | ICD-10-CM

## 2021-10-06 PROCEDURE — 12002 RPR S/N/AX/GEN/TRNK2.6-7.5CM: CPT

## 2021-10-06 PROCEDURE — 25000003 PHARM REV CODE 250: Performed by: STUDENT IN AN ORGANIZED HEALTH CARE EDUCATION/TRAINING PROGRAM

## 2021-10-06 RX ORDER — ONDANSETRON 4 MG/1
4 TABLET, ORALLY DISINTEGRATING ORAL
Status: COMPLETED | OUTPATIENT
Start: 2021-10-06 | End: 2021-10-06

## 2021-10-06 RX ORDER — LIDOCAINE HYDROCHLORIDE 10 MG/ML
10 INJECTION INFILTRATION; PERINEURAL
Status: COMPLETED | OUTPATIENT
Start: 2021-10-06 | End: 2021-10-06

## 2021-10-06 RX ORDER — BACITRACIN ZINC 500 [USP'U]/G
1 OINTMENT TOPICAL
Status: COMPLETED | OUTPATIENT
Start: 2021-10-06 | End: 2021-10-06

## 2021-10-06 RX ADMIN — BACITRACIN 1 EACH: 500 OINTMENT TOPICAL at 02:10

## 2021-10-06 RX ADMIN — ONDANSETRON 4 MG: 4 TABLET, ORALLY DISINTEGRATING ORAL at 01:10

## 2021-10-06 RX ADMIN — LIDOCAINE HYDROCHLORIDE 10 ML: 10 INJECTION, SOLUTION INFILTRATION; PERINEURAL at 01:10

## 2023-10-22 ENCOUNTER — HOSPITAL ENCOUNTER (OUTPATIENT)
Facility: HOSPITAL | Age: 38
Discharge: HOME | End: 2023-10-23
Attending: EMERGENCY MEDICINE | Admitting: SURGERY
Payer: MEDICAID

## 2023-10-22 DIAGNOSIS — S42.032A TRAUMATIC CLOSED FRACTURE OF DISTAL CLAVICLE WITH MINIMAL DISPLACEMENT, LEFT, INITIAL ENCOUNTER: Primary | ICD-10-CM

## 2023-10-22 DIAGNOSIS — R55 SYNCOPE: ICD-10-CM

## 2023-10-22 DIAGNOSIS — R06.02 SOB (SHORTNESS OF BREATH): ICD-10-CM

## 2023-10-22 DIAGNOSIS — J93.9 PNEUMOTHORAX ON LEFT: ICD-10-CM
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
## 2023-10-22 DIAGNOSIS — M25.512 ACUTE PAIN OF LEFT SHOULDER: ICD-10-CM

## 2023-10-22 DIAGNOSIS — M25.512 LEFT SHOULDER PAIN: ICD-10-CM

## 2023-10-22 DIAGNOSIS — T14.90XA TRAUMA: ICD-10-CM

## 2023-10-22 DIAGNOSIS — S42.115A CLOSED NONDISPLACED FRACTURE OF BODY OF LEFT SCAPULA, INITIAL ENCOUNTER: ICD-10-CM

## 2023-10-22 LAB
ALBUMIN SERPL BCP-MCNC: 4.7 G/DL (ref 3.5–5.2)
ALP SERPL-CCNC: 50 U/L (ref 55–135)
ALT SERPL W/O P-5'-P-CCNC: 36 U/L (ref 10–44)
AMPHET+METHAMPHET UR QL: NEGATIVE
ANION GAP SERPL CALC-SCNC: 17 MMOL/L (ref 8–16)
AST SERPL-CCNC: 50 U/L (ref 10–40)
BARBITURATES UR QL SCN>200 NG/ML: NEGATIVE
BASOPHILS # BLD AUTO: 0.06 K/UL (ref 0–0.2)
BASOPHILS NFR BLD: 0.4 % (ref 0–1.9)
BENZODIAZ UR QL SCN>200 NG/ML: NEGATIVE
BILIRUB SERPL-MCNC: 0.4 MG/DL (ref 0.1–1)
BUN SERPL-MCNC: 11 MG/DL (ref 6–20)
BZE UR QL SCN: NEGATIVE
CALCIUM SERPL-MCNC: 8.8 MG/DL (ref 8.7–10.5)
CANNABINOIDS UR QL SCN: ABNORMAL
CHLORIDE SERPL-SCNC: 107 MMOL/L (ref 95–110)
CO2 SERPL-SCNC: 19 MMOL/L (ref 23–29)
CREAT SERPL-MCNC: 0.8 MG/DL (ref 0.5–1.4)
CREAT UR-MCNC: 43 MG/DL (ref 23–375)
DIFFERENTIAL METHOD: ABNORMAL
EOSINOPHIL # BLD AUTO: 0.2 K/UL (ref 0–0.5)
EOSINOPHIL NFR BLD: 1.3 % (ref 0–8)
ERYTHROCYTE [DISTWIDTH] IN BLOOD BY AUTOMATED COUNT: 12.6 % (ref 11.5–14.5)
EST. GFR  (NO RACE VARIABLE): >60 ML/MIN/1.73 M^2
ETHANOL SERPL-MCNC: 59 MG/DL
ETHANOL UR-MCNC: 85 MG/DL
GLUCOSE SERPL-MCNC: 97 MG/DL (ref 70–110)
HCT VFR BLD AUTO: 43.5 % (ref 40–54)
HGB BLD-MCNC: 14.7 G/DL (ref 14–18)
IMM GRANULOCYTES # BLD AUTO: 0.1 K/UL (ref 0–0.04)
IMM GRANULOCYTES NFR BLD AUTO: 0.6 % (ref 0–0.5)
LYMPHOCYTES # BLD AUTO: 1.8 K/UL (ref 1–4.8)
LYMPHOCYTES NFR BLD: 11.1 % (ref 18–48)
MCH RBC QN AUTO: 31.5 PG (ref 27–31)
MCHC RBC AUTO-ENTMCNC: 33.8 G/DL (ref 32–36)
MCV RBC AUTO: 93 FL (ref 82–98)
METHADONE UR QL SCN>300 NG/ML: NEGATIVE
MONOCYTES # BLD AUTO: 0.9 K/UL (ref 0.3–1)
MONOCYTES NFR BLD: 5.6 % (ref 4–15)
NEUTROPHILS # BLD AUTO: 12.9 K/UL (ref 1.8–7.7)
NEUTROPHILS NFR BLD: 81 % (ref 38–73)
NRBC BLD-RTO: 0 /100 WBC
OPIATES UR QL SCN: ABNORMAL
PCP UR QL SCN>25 NG/ML: NEGATIVE
PLATELET # BLD AUTO: 199 K/UL (ref 150–450)
PMV BLD AUTO: 11 FL (ref 9.2–12.9)
POTASSIUM SERPL-SCNC: 3.7 MMOL/L (ref 3.5–5.1)
PROT SERPL-MCNC: 7.7 G/DL (ref 6–8.4)
RBC # BLD AUTO: 4.67 M/UL (ref 4.6–6.2)
SODIUM SERPL-SCNC: 143 MMOL/L (ref 136–145)
TOXICOLOGY INFORMATION: ABNORMAL
WBC # BLD AUTO: 15.86 K/UL (ref 3.9–12.7)

## 2023-10-22 PROCEDURE — 82077 ASSAY SPEC XCP UR&BREATH IA: CPT | Performed by: SURGERY

## 2023-10-22 PROCEDURE — 94761 N-INVAS EAR/PLS OXIMETRY MLT: CPT

## 2023-10-22 PROCEDURE — 63600175 PHARM REV CODE 636 W HCPCS

## 2023-10-22 PROCEDURE — 96361 HYDRATE IV INFUSION ADD-ON: CPT

## 2023-10-22 PROCEDURE — 99223 1ST HOSP IP/OBS HIGH 75: CPT | Mod: ,,, | Performed by: SURGERY

## 2023-10-22 PROCEDURE — 96374 THER/PROPH/DIAG INJ IV PUSH: CPT | Mod: 59

## 2023-10-22 PROCEDURE — 96375 TX/PRO/DX INJ NEW DRUG ADDON: CPT

## 2023-10-22 PROCEDURE — 99285 EMERGENCY DEPT VISIT HI MDM: CPT | Mod: 25

## 2023-10-22 PROCEDURE — 99900035 HC TECH TIME PER 15 MIN (STAT)

## 2023-10-22 PROCEDURE — G0378 HOSPITAL OBSERVATION PER HR: HCPCS

## 2023-10-22 PROCEDURE — 36415 COLL VENOUS BLD VENIPUNCTURE: CPT | Performed by: SURGERY

## 2023-10-22 PROCEDURE — 85025 COMPLETE CBC W/AUTO DIFF WBC: CPT | Performed by: EMERGENCY MEDICINE

## 2023-10-22 PROCEDURE — 93010 ELECTROCARDIOGRAM REPORT: CPT | Mod: ,,, | Performed by: INTERNAL MEDICINE

## 2023-10-22 PROCEDURE — 93005 ELECTROCARDIOGRAM TRACING: CPT

## 2023-10-22 PROCEDURE — 63600175 PHARM REV CODE 636 W HCPCS: Performed by: EMERGENCY MEDICINE

## 2023-10-22 PROCEDURE — 25000003 PHARM REV CODE 250: Performed by: STUDENT IN AN ORGANIZED HEALTH CARE EDUCATION/TRAINING PROGRAM

## 2023-10-22 PROCEDURE — 80307 DRUG TEST PRSMV CHEM ANLYZR: CPT | Performed by: SURGERY

## 2023-10-22 PROCEDURE — 80053 COMPREHEN METABOLIC PANEL: CPT | Performed by: EMERGENCY MEDICINE

## 2023-10-22 PROCEDURE — 99223 PR INITIAL HOSPITAL CARE,LEVL III: ICD-10-PCS | Mod: ,,, | Performed by: SURGERY

## 2023-10-22 PROCEDURE — 25500020 PHARM REV CODE 255: Performed by: SURGERY

## 2023-10-22 PROCEDURE — 25000003 PHARM REV CODE 250

## 2023-10-22 PROCEDURE — 93010 EKG 12-LEAD: ICD-10-PCS | Mod: ,,, | Performed by: INTERNAL MEDICINE

## 2023-10-22 RX ORDER — LIDOCAINE HYDROCHLORIDE 10 MG/ML
1 INJECTION, SOLUTION EPIDURAL; INFILTRATION; INTRACAUDAL; PERINEURAL ONCE AS NEEDED
Status: DISCONTINUED | OUTPATIENT
Start: 2023-10-22 | End: 2023-10-23 | Stop reason: HOSPADM

## 2023-10-22 RX ORDER — ONDANSETRON 8 MG/1
8 TABLET, ORALLY DISINTEGRATING ORAL EVERY 8 HOURS PRN
Status: DISCONTINUED | OUTPATIENT
Start: 2023-10-22 | End: 2023-10-23 | Stop reason: HOSPADM

## 2023-10-22 RX ORDER — ACETAMINOPHEN 325 MG/1
650 TABLET ORAL EVERY 4 HOURS PRN
Status: DISCONTINUED | OUTPATIENT
Start: 2023-10-22 | End: 2023-10-22

## 2023-10-22 RX ORDER — ACETAMINOPHEN 325 MG/1
650 TABLET ORAL EVERY 8 HOURS PRN
Status: DISCONTINUED | OUTPATIENT
Start: 2023-10-22 | End: 2023-10-22

## 2023-10-22 RX ORDER — GABAPENTIN 100 MG/1
100 CAPSULE ORAL 3 TIMES DAILY
Status: DISCONTINUED | OUTPATIENT
Start: 2023-10-22 | End: 2023-10-23 | Stop reason: HOSPADM

## 2023-10-22 RX ORDER — MORPHINE SULFATE 2 MG/ML
6 INJECTION, SOLUTION INTRAMUSCULAR; INTRAVENOUS
Status: COMPLETED | OUTPATIENT
Start: 2023-10-22 | End: 2023-10-22

## 2023-10-22 RX ORDER — OXYCODONE HYDROCHLORIDE 5 MG/1
5 TABLET ORAL EVERY 4 HOURS PRN
Status: DISCONTINUED | OUTPATIENT
Start: 2023-10-22 | End: 2023-10-23 | Stop reason: HOSPADM

## 2023-10-22 RX ORDER — OXYCODONE HYDROCHLORIDE 5 MG/1
5 TABLET ORAL EVERY 4 HOURS PRN
Status: DISCONTINUED | OUTPATIENT
Start: 2023-10-22 | End: 2023-10-22

## 2023-10-22 RX ORDER — LAMOTRIGINE 100 MG/1
100 TABLET ORAL 2 TIMES DAILY
Status: DISCONTINUED | OUTPATIENT
Start: 2023-10-22 | End: 2023-10-23 | Stop reason: HOSPADM

## 2023-10-22 RX ORDER — TALC
6 POWDER (GRAM) TOPICAL NIGHTLY PRN
Status: DISCONTINUED | OUTPATIENT
Start: 2023-10-22 | End: 2023-10-23 | Stop reason: HOSPADM

## 2023-10-22 RX ORDER — ACETAMINOPHEN 500 MG
1000 TABLET ORAL EVERY 8 HOURS
Status: DISCONTINUED | OUTPATIENT
Start: 2023-10-22 | End: 2023-10-23 | Stop reason: HOSPADM

## 2023-10-22 RX ORDER — LACOSAMIDE 50 MG/1
200 TABLET ORAL 2 TIMES DAILY
Status: DISCONTINUED | OUTPATIENT
Start: 2023-10-22 | End: 2023-10-23 | Stop reason: HOSPADM

## 2023-10-22 RX ORDER — HYDROMORPHONE HYDROCHLORIDE 1 MG/ML
0.2 INJECTION, SOLUTION INTRAMUSCULAR; INTRAVENOUS; SUBCUTANEOUS ONCE
Status: COMPLETED | OUTPATIENT
Start: 2023-10-22 | End: 2023-10-22

## 2023-10-22 RX ORDER — METHOCARBAMOL 500 MG/1
500 TABLET, FILM COATED ORAL 3 TIMES DAILY
Status: DISCONTINUED | OUTPATIENT
Start: 2023-10-22 | End: 2023-10-23 | Stop reason: HOSPADM

## 2023-10-22 RX ORDER — OXYCODONE HYDROCHLORIDE 10 MG/1
10 TABLET ORAL EVERY 4 HOURS PRN
Status: DISCONTINUED | OUTPATIENT
Start: 2023-10-22 | End: 2023-10-23 | Stop reason: HOSPADM

## 2023-10-22 RX ORDER — SODIUM CHLORIDE 0.9 % (FLUSH) 0.9 %
10 SYRINGE (ML) INJECTION
Status: DISCONTINUED | OUTPATIENT
Start: 2023-10-22 | End: 2023-10-23 | Stop reason: HOSPADM

## 2023-10-22 RX ADMIN — HYDROMORPHONE HYDROCHLORIDE 0.2 MG: 0.5 INJECTION, SOLUTION INTRAMUSCULAR; INTRAVENOUS; SUBCUTANEOUS at 12:10

## 2023-10-22 RX ADMIN — OXYCODONE HYDROCHLORIDE 10 MG: 10 TABLET ORAL at 04:10

## 2023-10-22 RX ADMIN — METHOCARBAMOL 500 MG: 500 TABLET ORAL at 03:10

## 2023-10-22 RX ADMIN — ACETAMINOPHEN 1000 MG: 500 TABLET ORAL at 03:10

## 2023-10-22 RX ADMIN — METHOCARBAMOL 500 MG: 500 TABLET ORAL at 09:10

## 2023-10-22 RX ADMIN — LAMOTRIGINE 100 MG: 100 TABLET ORAL at 10:10

## 2023-10-22 RX ADMIN — SODIUM CHLORIDE, POTASSIUM CHLORIDE, SODIUM LACTATE AND CALCIUM CHLORIDE 1000 ML: 600; 310; 30; 20 INJECTION, SOLUTION INTRAVENOUS at 04:10

## 2023-10-22 RX ADMIN — OXYCODONE HYDROCHLORIDE 10 MG: 10 TABLET ORAL at 09:10

## 2023-10-22 RX ADMIN — OXYCODONE HYDROCHLORIDE 5 MG: 5 TABLET ORAL at 09:10

## 2023-10-22 RX ADMIN — LACOSAMIDE 200 MG: 50 TABLET, FILM COATED ORAL at 10:10

## 2023-10-22 RX ADMIN — ACETAMINOPHEN 1000 MG: 500 TABLET ORAL at 09:10

## 2023-10-22 RX ADMIN — LAMOTRIGINE 100 MG: 100 TABLET ORAL at 09:10

## 2023-10-22 RX ADMIN — LACOSAMIDE 200 MG: 50 TABLET, FILM COATED ORAL at 09:10

## 2023-10-22 RX ADMIN — METHOCARBAMOL 500 MG: 500 TABLET ORAL at 10:10

## 2023-10-22 RX ADMIN — IOHEXOL 150 ML: 350 INJECTION, SOLUTION INTRAVENOUS at 08:10

## 2023-10-22 RX ADMIN — MORPHINE SULFATE 6 MG: 2 INJECTION, SOLUTION INTRAMUSCULAR; INTRAVENOUS at 04:10

## 2023-10-22 NOTE — NURSING
Nurses Note -- 4 Eyes      10/22/2023   6:38 PM      Skin assessed during: Admit      [] No Altered Skin Integrity Present    []Prevention Measures Documented      [x] Yes- Altered Skin Integrity Present or Discovered   [] LDA Added if Not in Epic (Describe Wound)   [x] New Altered Skin Integrity was Present on Admit and Documented in LDA   [] Wound Image Taken    Wound Care Consulted? No    Attending Nurse:  Jermaine Dunham RN    Second RN/Staff Member:   Jovanna SHORT
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
## 2023-10-22 NOTE — SUBJECTIVE & OBJECTIVE
No current facility-administered medications on file prior to encounter.     Current Outpatient Medications on File Prior to Encounter   Medication Sig    lamotrigine (LAMICTAL) 100 MG tablet TK 1 T PO BID    VIMPAT 200 mg Tab Take 200 mg by mouth 2 (two) times daily.     acetaminophen (TYLENOL) 325 MG tablet Take 650 mg by mouth.    docusate sodium (COLACE) 100 MG capsule Take 100 mg by mouth.    lacosamide (VIMPAT) 200 mg Tab Take 200 mg by mouth.    levetiracetam (KEPPRA) 1000 MG tablet Take 1 tablet (1,000 mg total) by mouth 2 (two) times daily.    methocarbamol (ROBAXIN) 750 MG Tab Take 750 mg by mouth.    oxycodone-acetaminophen (PERCOCET) 5-325 mg per tablet Take 1-2 tablets by mouth.    polyethylene glycol (GLYCOLAX) 17 gram PwPk Take 17 g by mouth.    senna (SENOKOT) 8.6 mg tablet Take 8.6 mg by mouth.       Review of patient's allergies indicates:  No Known Allergies    Past Medical History:   Diagnosis Date    Seizures      Past Surgical History:   Procedure Laterality Date    BRAIN SURGERY  07/14/2017    amygdalohippocampectomy    SHOULDER SURGERY       Family History       Problem Relation (Age of Onset)    Diabetes Maternal Grandfather    Heart disease Father          Tobacco Use    Smoking status: Former     Current packs/day: 0.10     Types: Cigarettes    Smokeless tobacco: Never   Substance and Sexual Activity    Alcohol use: Yes     Alcohol/week: 0.8 standard drinks of alcohol     Types: 1 Standard drinks or equivalent per week    Drug use: Yes     Frequency: 7.0 times per week     Types: Marijuana    Sexual activity: Yes     Partners: Female     Review of Systems   Constitutional:  Negative for activity change, appetite change, chills, fatigue and fever.   HENT:  Negative for congestion, sinus pressure, sinus pain and sore throat.    Eyes:  Negative for visual disturbance.   Respiratory:  Negative for cough, choking, chest tightness, shortness of breath and wheezing.         Tenderness left chest  wall   Cardiovascular:  Negative for chest pain and palpitations.   Gastrointestinal:  Negative for abdominal distention, abdominal pain, constipation, diarrhea, nausea and vomiting.   Genitourinary:  Negative for difficulty urinating, dysuria and urgency.   Musculoskeletal:  Negative for back pain and myalgias.        Left shoulder pain   Neurological:  Negative for dizziness and weakness.        +LOC     Objective:     Vital Signs (Most Recent):  Temp: 97.9 °F (36.6 °C) (10/22/23 0600)  Pulse: 86 (10/22/23 0739)  Resp: (!) 30 (10/22/23 0739)  BP: 127/76 (10/22/23 0739)  SpO2: 97 % (10/22/23 0739) Vital Signs (24h Range):  Temp:  [97.4 °F (36.3 °C)-97.9 °F (36.6 °C)] 97.9 °F (36.6 °C)  Pulse:  [62-86] 86  Resp:  [18-30] 30  SpO2:  [96 %-100 %] 97 %  BP: (127-138)/(76-86) 127/76     Weight: 86.2 kg (190 lb 0.6 oz)  Body mass index is 26.5 kg/m².     Physical Exam  Constitutional:       General: He is not in acute distress.  HENT:      Head:      Comments: Left scalp hematoma  Eyes:      Extraocular Movements: Extraocular movements intact.      Conjunctiva/sclera: Conjunctivae normal.      Pupils: Pupils are equal, round, and reactive to light.   Cardiovascular:      Rate and Rhythm: Normal rate and regular rhythm.   Pulmonary:      Effort: Pulmonary effort is normal. No respiratory distress.      Comments: Left chest wall tenderness. Tenderness over left clavicle.  Abdominal:      General: There is no distension.      Palpations: Abdomen is soft.      Tenderness: There is no abdominal tenderness.      Comments: Left sided abrasions    Musculoskeletal:      Comments: Left shoulder tenderness. Abrasions and ecchymosis   Spine negative for tenderness, abrasions, ecchymosis, or step off   Neurological:      General: No focal deficit present.      Mental Status: He is alert and oriented to person, place, and time.            I have reviewed all pertinent lab results within the past 24 hours.  CBC:   Recent Labs   Lab  10/22/23  0417   WBC 15.86*   RBC 4.67   HGB 14.7   HCT 43.5      MCV 93   MCH 31.5*   MCHC 33.8     CMP:   Recent Labs   Lab 10/22/23  0417   GLU 97   CALCIUM 8.8   ALBUMIN 4.7   PROT 7.7      K 3.7   CO2 19*      BUN 11   CREATININE 0.8   ALKPHOS 50*   ALT 36   AST 50*   BILITOT 0.4       Significant Diagnostics:  I have reviewed all pertinent imaging results/findings within the past 24 hours.

## 2023-10-22 NOTE — ED PROVIDER NOTES
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 plan.    Shoulder immobilizer placed. []      ED Course User Index  [] Roma Castaneda MD          7:00 AM  Patient turned over to oncoming team pending General surgery evaluation, orthopedic evaluation and admission.          Clinical Impression:   Final diagnoses:  [M25.512] Left shoulder pain  [R06.02] SOB (shortness of breath)  [R55] Syncope  [S42.032A] Traumatic closed fracture of distal clavicle with minimal displacement, left, initial encounter (Primary)  [S42.115A] Closed nondisplaced fracture of body of left scapula, initial encounter  [J93.9] Pneumothorax on left  [T14.90XA] Trauma        ED Disposition Condition    Observation                 Roma Castaneda MD  10/23/23 0033

## 2023-10-22 NOTE — PLAN OF CARE
[facility]  Initial Discharge Assessment       Primary Care Provider: No, Primary Doctor    Admission Diagnosis: Syncope [R55]  SOB (shortness of breath) [R06.02]  Left shoulder pain [M25.512]    Admission Date: 10/22/2023  Expected Discharge Date: 10/25/2023    Transition of Care Barriers: (P) Substance Abuse    Payor: MEDICAID / Plan: [place] [facility] / Product Type: Managed Medicaid /     Extended Emergency Contact Information  Primary Emergency Contact: Fransisca Nur   [place]  Mobile Phone: [phone]  Relation: Mother    Discharge Plan A: (P) Home with family  Discharge Plan B: (P) Rehab      [id number] - [place], [place] - [address]  [address]  [place] [address]  Phone: [phone] Fax: [phone]    [id number] - [place], [place] - [address] [facility]  [address]  [facility] [address]  Phone: [phone] Fax: [phone]      Initial Assessment (most recent)       Adult Discharge Assessment - 10/22/23 1611          Discharge Assessment    Assessment Type Discharge Planning Assessment (P)      Confirmed/corrected address, phone number and insurance Yes (P)      Confirmed Demographics Correct on Facesheet (P)      Source of Information patient;family;health record (P)      Does patient/caregiver understand observation status Yes (P)      Communicated JEFFY with patient/caregiver No (P)      Do you expect to return to your current living situation? Other (see comments) (P)    tbd    Do you have help at home or someone to help you manage your care at home? Yes (P)      Prior to hospitilization cognitive status: Alert/Oriented (P)      Current cognitive status: Alert/Oriented (P)      Equipment Currently Used at Home none (P)      Readmission within 30 days? No (P)      Patient currently being followed by outpatient case management? No (P)      Do you currently have service(s) that  help you manage your care at home? No (P)      Do you take prescription medications? Yes (P)      Do you have prescription coverage? Yes (P)      Do you have any problems affording any of your prescribed medications? No (P)      Is the patient taking medications as prescribed? yes (P)      How do you get to doctors appointments? family or friend will provide;other (see comments) (P)    e-bike    Are you on dialysis? No (P)      DME Needed Upon Discharge  other (see comments) (P)    tbd    Discharge Plan discussed with: Patient (P)      Transition of Care Barriers Substance Abuse (P)      Discharge Plan A Home with family (P)      Discharge Plan B Rehab (P)         Physical Activity    On average, how many days per week do you engage in moderate to strenuous exercise (like a brisk walk)? 3 days (P)      On average, how many minutes do you engage in exercise at this level? 30 min (P)         Financial Resource Strain    How hard is it for you to pay for the very basics like food, housing, medical care, and heating? Not hard at all (P)         Housing Stability    In the last 12 months, was there a time when you were not able to pay the mortgage or rent on time? No (P)      In the last 12 months, was there a time when you did not have a steady place to sleep or slept in a shelter (including now)? No (P)         Transportation Needs    In the past 12 months, has lack of transportation kept you from medical appointments or from getting medications? No (P)      In the past 12 months, has lack of transportation kept you from meetings, work, or from getting things needed for daily living? No (P)         Food Insecurity    Within the past 12 months, you worried that your food would run out before you got the money to buy more. Never true (P)      Within the past 12 months, the food you bought just didn't last and you didn't have money to get more. Never true (P)         Stress    Do you feel stress - tense, restless,  nervous, or anxious, or unable to sleep at night because your mind is troubled all the time - these days? Only a little (P)         Social Connections    In a typical week, how many times do you talk on the phone with family, friends, or neighbors? Twice a week (P)      How often do you get together with friends or relatives? Twice a week (P)      Do you belong to any clubs or organizations such as Yars[employer] groups, unions, fraternal or athletic groups, or school groups? No (P)      Are you , , , , never , or living with a partner?  (P)         Alcohol Use    Q1: How often do you have a drink containing alcohol? 2-3 times a week (P)      Q2: How many drinks containing alcohol do you have on a typical day when you are drinking? 3 or 4 (P)      Q3: How often do you have six or more drinks on one occasion? Weekly (P)

## 2023-10-22 NOTE — H&P
Please see General Surgery Consult Note dated 10/22/23 for full H&P.    Mikki Ford MD  General Surgery, PGY-4

## 2023-10-22 NOTE — PLAN OF CARE
Problem: Adult Inpatient Plan of Care  Goal: Plan of Care Review  Outcome: Ongoing, Progressing  Goal: Patient-Specific Goal (Individualized)  Outcome: Ongoing, Progressing  Goal: Absence of Hospital-Acquired Illness or Injury  Outcome: Ongoing, Progressing  Goal: Optimal Comfort and Wellbeing  Outcome: Ongoing, Progressing  Goal: Readiness for Transition of Care  Outcome: Ongoing, Progressing     Problem: Fluid and Electrolyte Imbalance (Acute Kidney Injury/Impairment)  Goal: Fluid and Electrolyte Balance  Outcome: Ongoing, Progressing     Problem: Oral Intake Inadequate (Acute Kidney Injury/Impairment)  Goal: Optimal Nutrition Intake  Outcome: Ongoing, Progressing     Problem: Renal Function Impairment (Acute Kidney Injury/Impairment)  Goal: Effective Renal Function  Outcome: Ongoing, Progressing     Patient was in pain throughout the day, medication was given to patient to help decrease pain which helped. Patient family was at the bedside throughout the day. Was NPO for most of the day pending potential surgery but was decided that will not happen at this time. Plan of care continues.

## 2023-10-22 NOTE — SUBJECTIVE & OBJECTIVE
No current facility-administered medications on file prior to encounter.     Current Outpatient Medications on File Prior to Encounter   Medication Sig    lamotrigine (LAMICTAL) 100 MG tablet TK 1 T PO BID    VIMPAT 200 mg Tab Take 200 mg by mouth 2 (two) times daily.     acetaminophen (TYLENOL) 325 MG tablet Take 650 mg by mouth.    docusate sodium (COLACE) 100 MG capsule Take 100 mg by mouth.    lacosamide (VIMPAT) 200 mg Tab Take 200 mg by mouth.    levetiracetam (KEPPRA) 1000 MG tablet Take 1 tablet (1,000 mg total) by mouth 2 (two) times daily.    methocarbamol (ROBAXIN) 750 MG Tab Take 750 mg by mouth.    oxycodone-acetaminophen (PERCOCET) 5-325 mg per tablet Take 1-2 tablets by mouth.    polyethylene glycol (GLYCOLAX) 17 gram PwPk Take 17 g by mouth.    senna (SENOKOT) 8.6 mg tablet Take 8.6 mg by mouth.       Review of patient's allergies indicates:  No Known Allergies    Past Medical History:   Diagnosis Date    Seizures      Past Surgical History:   Procedure Laterality Date    BRAIN SURGERY  07/14/2017    amygdalohippocampectomy    SHOULDER SURGERY       Family History       Problem Relation (Age of Onset)    Diabetes Maternal Grandfather    Heart disease Father          Tobacco Use    Smoking status: Former     Current packs/day: 0.10     Types: Cigarettes    Smokeless tobacco: Never   Substance and Sexual Activity    Alcohol use: Yes     Alcohol/week: 0.8 standard drinks of alcohol     Types: 1 Standard drinks or equivalent per week    Drug use: Yes     Frequency: 7.0 times per week     Types: Marijuana    Sexual activity: Yes     Partners: Female     Review of Systems   Constitutional:  Negative for activity change, appetite change, chills, fatigue and fever.   HENT:  Negative for congestion, sinus pressure, sinus pain and sore throat.    Eyes:  Negative for visual disturbance.   Respiratory:  Negative for cough, choking, chest tightness, shortness of breath and wheezing.         Denies SOB. Left sided  chest pain. Increased pain with deep breaths.    Cardiovascular:  Negative for chest pain and palpitations.   Gastrointestinal:  Negative for abdominal distention, abdominal pain, constipation, diarrhea, nausea and vomiting.   Genitourinary:  Negative for difficulty urinating, dysuria and urgency.   Musculoskeletal:  Negative for back pain and myalgias.        Left shoulder pain   Neurological:  Negative for dizziness and weakness.        +LOC     Objective:     Vital Signs (Most Recent):  Temp: 97.9 °F (36.6 °C) (10/22/23 0600)  Pulse: 86 (10/22/23 0739)  Resp: (!) 30 (10/22/23 0739)  BP: 127/76 (10/22/23 0739)  SpO2: 97 % (10/22/23 0739) Vital Signs (24h Range):  Temp:  [97.4 °F (36.3 °C)-97.9 °F (36.6 °C)] 97.9 °F (36.6 °C)  Pulse:  [62-86] 86  Resp:  [18-30] 30  SpO2:  [96 %-100 %] 97 %  BP: (127-138)/(76-86) 127/76     Weight: 86.2 kg (190 lb 0.6 oz)  Body mass index is 26.5 kg/m².      Intake/Output - Last 3 Shifts       None            SpO2: 97 %        Physical Exam  Constitutional:       General: He is not in acute distress.  HENT:      Head:      Comments: Left scalp hematoma  Eyes:      Extraocular Movements: Extraocular movements intact.      Conjunctiva/sclera: Conjunctivae normal.      Pupils: Pupils are equal, round, and reactive to light.   Cardiovascular:      Rate and Rhythm: Normal rate and regular rhythm.      Pulses: Normal pulses.   Pulmonary:      Effort: Pulmonary effort is normal. No respiratory distress.      Comments: Left chest wall tenderness  Tenderness over clavicle   Abdominal:      General: There is no distension.      Palpations: Abdomen is soft.      Tenderness: There is no abdominal tenderness.      Comments: Left sided abrasions    Musculoskeletal:      Comments: Left shoulder ecchymosis and abrasions. Tender to palpation.  Spine negative for tenderness, ecchymosis or step off    Neurological:      General: No focal deficit present.      Mental Status: He is alert and oriented to  person, place, and time.      Sensory: No sensory deficit.      Motor: No weakness.            Significant Labs:  CBC:   Recent Labs   Lab 10/22/23  0417   WBC 15.86*   RBC 4.67   HGB 14.7   HCT 43.5      MCV 93   MCH 31.5*   MCHC 33.8     CMP:   Recent Labs   Lab 10/22/23  0417   GLU 97   CALCIUM 8.8   ALBUMIN 4.7   PROT 7.7      K 3.7   CO2 19*      BUN 11   CREATININE 0.8   ALKPHOS 50*   ALT 36   AST 50*   BILITOT 0.4       Significant Diagnostics:  CT: I have reviewed all pertinent results/findings within the past 24 hours    VTE Risk Mitigation (From admission, onward)           Ordered     Place sequential compression device  Until discontinued         10/22/23 0708     IP VTE LOW RISK PATIENT  Once         10/22/23 0708
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
## 2023-10-23 VITALS
OXYGEN SATURATION: 97 % | HEART RATE: 81 BPM | RESPIRATION RATE: 18 BRPM | TEMPERATURE: 98 F | BODY MASS INDEX: 26.6 KG/M2 | WEIGHT: 190 LBS | HEIGHT: 71 IN | DIASTOLIC BLOOD PRESSURE: 72 MMHG | SYSTOLIC BLOOD PRESSURE: 120 MMHG

## 2023-10-23 PROBLEM — S42.002A FRACTURE OF LEFT CLAVICLE: Status: ACTIVE | Noted: 2023-10-23

## 2023-10-23 PROBLEM — G40.909 SEIZURE DISORDER: Chronic | Status: ACTIVE | Noted: 2023-10-23

## 2023-10-23 PROBLEM — J98.11 ATELECTASIS: Status: ACTIVE | Noted: 2023-10-23

## 2023-10-23 PROBLEM — R78.0: Status: ACTIVE | Noted: 2023-10-23

## 2023-10-23 PROBLEM — J93.9 PNEUMOTHORAX, LEFT: Status: ACTIVE | Noted: 2023-10-23

## 2023-10-23 PROBLEM — S22.42XA FRACTURE OF MULTIPLE RIBS OF LEFT SIDE: Status: ACTIVE | Noted: 2023-10-23

## 2023-10-23 PROBLEM — S42.109A SCAPULA FRACTURE: Status: ACTIVE | Noted: 2023-10-23

## 2023-10-23 PROBLEM — K44.9 HIATAL HERNIA: Status: ACTIVE | Noted: 2023-10-23

## 2023-10-23 PROBLEM — G40.909 SEIZURE DISORDER: Status: ACTIVE | Noted: 2023-10-23

## 2023-10-23 LAB
ANION GAP SERPL CALC-SCNC: 10 MMOL/L (ref 8–16)
BASOPHILS # BLD AUTO: 0.03 K/UL (ref 0–0.2)
BASOPHILS NFR BLD: 0.4 % (ref 0–1.9)
BUN SERPL-MCNC: 9 MG/DL (ref 6–20)
CALCIUM SERPL-MCNC: 9.2 MG/DL (ref 8.7–10.5)
CHLORIDE SERPL-SCNC: 103 MMOL/L (ref 95–110)
CO2 SERPL-SCNC: 25 MMOL/L (ref 23–29)
CREAT SERPL-MCNC: 0.8 MG/DL (ref 0.5–1.4)
DIFFERENTIAL METHOD: ABNORMAL
EOSINOPHIL # BLD AUTO: 0.2 K/UL (ref 0–0.5)
EOSINOPHIL NFR BLD: 2.5 % (ref 0–8)
ERYTHROCYTE [DISTWIDTH] IN BLOOD BY AUTOMATED COUNT: 12.7 % (ref 11.5–14.5)
EST. GFR  (NO RACE VARIABLE): >60 ML/MIN/1.73 M^2
GLUCOSE SERPL-MCNC: 106 MG/DL (ref 70–110)
HCT VFR BLD AUTO: 46 % (ref 40–54)
HGB BLD-MCNC: 15.6 G/DL (ref 14–18)
IMM GRANULOCYTES # BLD AUTO: 0.02 K/UL (ref 0–0.04)
IMM GRANULOCYTES NFR BLD AUTO: 0.2 % (ref 0–0.5)
LYMPHOCYTES # BLD AUTO: 1.7 K/UL (ref 1–4.8)
LYMPHOCYTES NFR BLD: 21.6 % (ref 18–48)
MAGNESIUM SERPL-MCNC: 1.9 MG/DL (ref 1.6–2.6)
MCH RBC QN AUTO: 31.1 PG (ref 27–31)
MCHC RBC AUTO-ENTMCNC: 33.9 G/DL (ref 32–36)
MCV RBC AUTO: 92 FL (ref 82–98)
MONOCYTES # BLD AUTO: 1 K/UL (ref 0.3–1)
MONOCYTES NFR BLD: 12.5 % (ref 4–15)
NEUTROPHILS # BLD AUTO: 5.1 K/UL (ref 1.8–7.7)
NEUTROPHILS NFR BLD: 62.8 % (ref 38–73)
NRBC BLD-RTO: 0 /100 WBC
PHOSPHATE SERPL-MCNC: 2.5 MG/DL (ref 2.7–4.5)
PLATELET # BLD AUTO: 178 K/UL (ref 150–450)
PMV BLD AUTO: 10.8 FL (ref 9.2–12.9)
POTASSIUM SERPL-SCNC: 4.2 MMOL/L (ref 3.5–5.1)
RBC # BLD AUTO: 5.02 M/UL (ref 4.6–6.2)
SODIUM SERPL-SCNC: 138 MMOL/L (ref 136–145)
WBC # BLD AUTO: 8.05 K/UL (ref 3.9–12.7)

## 2023-10-23 PROCEDURE — 25000003 PHARM REV CODE 250: Performed by: STUDENT IN AN ORGANIZED HEALTH CARE EDUCATION/TRAINING PROGRAM

## 2023-10-23 PROCEDURE — 36415 COLL VENOUS BLD VENIPUNCTURE: CPT | Performed by: STUDENT IN AN ORGANIZED HEALTH CARE EDUCATION/TRAINING PROGRAM

## 2023-10-23 PROCEDURE — 80048 BASIC METABOLIC PNL TOTAL CA: CPT | Performed by: STUDENT IN AN ORGANIZED HEALTH CARE EDUCATION/TRAINING PROGRAM

## 2023-10-23 PROCEDURE — 25000003 PHARM REV CODE 250

## 2023-10-23 PROCEDURE — 83735 ASSAY OF MAGNESIUM: CPT | Performed by: STUDENT IN AN ORGANIZED HEALTH CARE EDUCATION/TRAINING PROGRAM

## 2023-10-23 PROCEDURE — 84100 ASSAY OF PHOSPHORUS: CPT | Performed by: STUDENT IN AN ORGANIZED HEALTH CARE EDUCATION/TRAINING PROGRAM

## 2023-10-23 PROCEDURE — 85025 COMPLETE CBC W/AUTO DIFF WBC: CPT | Performed by: STUDENT IN AN ORGANIZED HEALTH CARE EDUCATION/TRAINING PROGRAM

## 2023-10-23 PROCEDURE — 94761 N-INVAS EAR/PLS OXIMETRY MLT: CPT

## 2023-10-23 RX ORDER — ENOXAPARIN SODIUM 100 MG/ML
40 INJECTION SUBCUTANEOUS EVERY 24 HOURS
Status: DISCONTINUED | OUTPATIENT
Start: 2023-10-23 | End: 2023-10-23 | Stop reason: HOSPADM

## 2023-10-23 RX ORDER — OXYCODONE HYDROCHLORIDE 5 MG/1
5 TABLET ORAL EVERY 6 HOURS PRN
Qty: 12 TABLET | Refills: 0 | Status: SHIPPED | OUTPATIENT
Start: 2023-10-23

## 2023-10-23 RX ORDER — SODIUM,POTASSIUM PHOSPHATES 280-250MG
2 POWDER IN PACKET (EA) ORAL ONCE
Status: COMPLETED | OUTPATIENT
Start: 2023-10-23 | End: 2023-10-23

## 2023-10-23 RX ADMIN — OXYCODONE HYDROCHLORIDE 10 MG: 10 TABLET ORAL at 05:10

## 2023-10-23 RX ADMIN — LAMOTRIGINE 100 MG: 100 TABLET ORAL at 10:10

## 2023-10-23 RX ADMIN — METHOCARBAMOL 500 MG: 500 TABLET ORAL at 10:10

## 2023-10-23 RX ADMIN — ACETAMINOPHEN 1000 MG: 500 TABLET ORAL at 05:10

## 2023-10-23 RX ADMIN — OXYCODONE HYDROCHLORIDE 10 MG: 10 TABLET ORAL at 01:10

## 2023-10-23 RX ADMIN — LACOSAMIDE 200 MG: 50 TABLET, FILM COATED ORAL at 10:10

## 2023-10-23 RX ADMIN — POTASSIUM & SODIUM PHOSPHATES POWDER PACK 280-160-250 MG 2 PACKET: 280-160-250 PACK at 10:10

## 2023-10-23 RX ADMIN — GABAPENTIN 100 MG: 100 CAPSULE ORAL at 10:10

## 2023-10-23 NOTE — PLAN OF CARE
[facility]  Discharge Final Note    Primary Care Provider: No, Primary Doctor    Expected Discharge Date: 10/23/2023    Final Discharge Note (most recent)       Final Note - 10/23/23 1513          Final Note    Assessment Type Final Discharge Note     Anticipated Discharge Disposition Home or Self Care     What phone number can be called within the next 1-3 days to see how you are doing after discharge? --   [phone]    Hospital Resources/Appts/Education Provided Post-Acute resouces added to AVS                     Important Message from Medicare           Patient discharged home to care of family on 10/23/23.      Jessica Saldana RNCM  Case Management  [facility]  [phone]
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
## 2023-10-23 NOTE — SUBJECTIVE & OBJECTIVE
Interval History: No acute events overnight. HDS on RA. Pain well controlled.     Medications:  Continuous Infusions:  Scheduled Meds:   acetaminophen  1,000 mg Oral Q8H    gabapentin  100 mg Oral TID    lacosamide  200 mg Oral BID    lamoTRIgine  100 mg Oral BID    methocarbamoL  500 mg Oral TID     PRN Meds:LIDOcaine (PF) 10 mg/ml (1%), melatonin, ondansetron, oxyCODONE, oxyCODONE, sodium chloride 0.9%     Review of patient's allergies indicates:  No Known Allergies  Objective:     Vital Signs (Most Recent):  Temp: 98 °F (36.7 °C) (10/23/23 0339)  Pulse: 63 (10/23/23 0339)  Resp: 18 (10/23/23 0508)  BP: 125/63 (10/23/23 0339)  SpO2: 95 % (10/23/23 0339) Vital Signs (24h Range):  Temp:  [97.7 °F (36.5 °C)-98.9 °F (37.2 °C)] 98 °F (36.7 °C)  Pulse:  [59-88] 63  Resp:  [16-30] 18  SpO2:  [94 %-99 %] 95 %  BP: (118-136)/(63-86) 125/63     Weight: 86.2 kg (190 lb)  Body mass index is 26.5 kg/m².    Intake/Output - Last 3 Shifts         10/21 0700  10/22 0659 10/22 0700  10/23 0659 10/23 0700  10/24 0659    P.O.  360     IV Piggyback  1004.7     Total Intake(mL/kg)  1364.7 (15.8)     Net  +1364.7            Urine Occurrence  1 x              Physical Exam  Constitutional:       General: He is not in acute distress.  HENT:      Head:      Comments: Left scalp hematoma  Eyes:      Extraocular Movements: Extraocular movements intact.      Conjunctiva/sclera: Conjunctivae normal.      Pupils: Pupils are equal, round, and reactive to light.   Cardiovascular:      Rate and Rhythm: Normal rate and regular rhythm.   Pulmonary:      Effort: Pulmonary effort is normal. No respiratory distress.      Comments: Left chest wall tenderness. Tenderness over left clavicle.  Abdominal:      General: There is no distension.      Palpations: Abdomen is soft.      Tenderness: There is no abdominal tenderness.      Comments: Left sided abrasions    Musculoskeletal:      Comments: Left shoulder tenderness. Abrasions and ecchymosis   Spine  negative for tenderness, abrasions, ecchymosis, or step off   Neurological:      General: No focal deficit present.      Mental Status: He is alert and oriented to person, place, and time.          Significant Labs:  I have reviewed all pertinent lab results within the past 24 hours.  CBC:   Recent Labs   Lab 10/23/23  0548   WBC 8.05   RBC 5.02   HGB 15.6   HCT 46.0      MCV 92   MCH 31.1*   MCHC 33.9     CMP:   Recent Labs   Lab 10/22/23  0417 10/23/23  0548   GLU 97 106   CALCIUM 8.8 9.2   ALBUMIN 4.7  --    PROT 7.7  --     138   K 3.7 4.2   CO2 19* 25    103   BUN 11 9   CREATININE 0.8 0.8   ALKPHOS 50*  --    ALT 36  --    AST 50*  --    BILITOT 0.4  --        Significant Diagnostics:  I have reviewed all pertinent imaging results/findings within the past 24 hours.
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
## 2023-10-23 NOTE — PLAN OF CARE
Problem: Adult Inpatient Plan of Care  Goal: Plan of Care Review  Outcome: Ongoing, Progressing  Flowsheets (Taken 10/22/2023 2239)  Plan of Care Reviewed With: patient  Goal: Patient-Specific Goal (Individualized)  Outcome: Ongoing, Progressing  Flowsheets (Taken 10/22/2023 2239)  Anxieties, Fears or Concerns: Plan of care  Individualized Care Needs: Pain management  Goal: Absence of Hospital-Acquired Illness or Injury  Outcome: Ongoing, Progressing  Intervention: Identify and Manage Fall Risk  Flowsheets (Taken 10/22/2023 2239)  Safety Promotion/Fall Prevention:   assistive device/personal item within reach   bed alarm set   side rails raised x 2   medications reviewed   high risk medications identified  Intervention: Prevent Skin Injury  Flowsheets (Taken 10/22/2023 2239)  Body Position: position changed independently  Skin Protection:   adhesive use limited   transparent dressing maintained   tubing/devices free from skin contact  Intervention: Prevent and Manage VTE (Venous Thromboembolism) Risk  Flowsheets (Taken 10/22/2023 2239)  Activity Management:   Ankle pumps - L1   Arm raise - L1  VTE Prevention/Management:   remove, assess skin, and reapply sequential compression device   ambulation promoted   bleeding risk assessed   bleeding precations maintained  Range of Motion: active ROM (range of motion) encouraged  Goal: Optimal Comfort and Wellbeing  Outcome: Ongoing, Progressing  Intervention: Monitor Pain and Promote Comfort  Flowsheets (Taken 10/22/2023 2239)  Pain Management Interventions: pain management plan reviewed with patient/caregiver  Intervention: Provide Person-Centered Care  Flowsheets (Taken 10/22/2023 2239)  Trust Relationship/Rapport:   care explained   questions answered   questions encouraged  Goal: Readiness for Transition of Care  Outcome: Ongoing, Progressing     Problem: Pain Acute  Goal: Acceptable Pain Control and Functional Ability  Outcome: Ongoing, Progressing  Intervention:  Develop Pain Management Plan  Flowsheets (Taken 10/22/2023 2239)  Pain Management Interventions: pain management plan reviewed with patient/caregiver  Intervention: Prevent or Manage Pain  Flowsheets (Taken 10/22/2023 2239)  Sleep/Rest Enhancement:   family presence promoted   relaxation techniques promoted   regular sleep/rest pattern promoted  Bowel Elimination Promotion:   adequate fluid intake promoted   ambulation promoted  Medication Review/Management:   medications reviewed   high-risk medications identified  Intervention: Optimize Psychosocial Wellbeing  Flowsheets (Taken 10/22/2023 2239)  Supportive Measures:   active listening utilized   relaxation techniques promoted   self-care encouraged     Problem: Fall Injury Risk  Goal: Absence of Fall and Fall-Related Injury  Outcome: Ongoing, Progressing  Intervention: Identify and Manage Contributors  Flowsheets (Taken 10/22/2023 2239)  Self-Care Promotion:   independence encouraged   BADL personal objects within reach   BADL personal routines maintained  Medication Review/Management:   medications reviewed   high-risk medications identified  Intervention: Promote Injury-Free Environment  Flowsheets (Taken 10/22/2023 2239)  Safety Promotion/Fall Prevention:   assistive device/personal item within reach   bed alarm set   side rails raised x 2   medications reviewed   high risk medications identified
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
## 2023-10-23 NOTE — SUBJECTIVE & OBJECTIVE
Past Medical History:   Diagnosis Date    Seizures        Past Surgical History:   Procedure Laterality Date    BRAIN SURGERY  07/14/2017    amygdalohippocampectomy    SHOULDER SURGERY         Review of patient's allergies indicates:  No Known Allergies    Current Facility-Administered Medications   Medication    acetaminophen tablet 1,000 mg    enoxaparin injection 40 mg    gabapentin capsule 100 mg    lacosamide tablet 200 mg    lamoTRIgine tablet 100 mg    LIDOcaine (PF) 10 mg/ml (1%) injection 10 mg    melatonin tablet 6 mg    methocarbamoL tablet 500 mg    ondansetron disintegrating tablet 8 mg    oxyCODONE immediate release tablet 5 mg    oxyCODONE immediate release tablet Tab 10 mg    sodium chloride 0.9% flush 10 mL     Family History       Problem Relation (Age of Onset)    Diabetes Maternal Grandfather    Heart disease Father          Tobacco Use    Smoking status: Former     Current packs/day: 0.10     Types: Cigarettes    Smokeless tobacco: Never   Substance and Sexual Activity    Alcohol use: Yes     Alcohol/week: 0.8 standard drinks of alcohol     Types: 1 Standard drinks or equivalent per week    Drug use: Yes     Frequency: 7.0 times per week     Types: Marijuana    Sexual activity: Yes     Partners: Female     ROS  Constitutional: negative for fevers  Eyes: negative visual changes  ENT: negative for hearing loss  Respiratory: negative for dyspnea  Cardiovascular: positive for chest pain  Gastrointestinal: negative for abdominal pain  Genitourinary: negative for dysuria  Neurological: negative for headaches  Behavioral/Psych: negative for hallucinations  Endocrine: negative for temperature intolerance      Objective:     Vital Signs (Most Recent):  Temp: 98 °F (36.7 °C) (10/23/23 0732)  Pulse: 67 (10/23/23 0732)  Resp: 18 (10/23/23 0732)  BP: 134/76 (10/23/23 0732)  SpO2: 97 % (10/23/23 0732) Vital Signs (24h Range):  Temp:  [97.7 °F (36.5 °C)-98.9 °F (37.2 °C)] 98 °F (36.7 °C)  Pulse:  [59-88]  "67  Resp:  [16-30] 18  SpO2:  [94 %-99 %] 97 %  BP: (118-136)/(63-86) 134/76     Weight: 86.2 kg (190 lb)  Height: 5' 11" (180.3 cm)  Body mass index is 26.5 kg/m².      Intake/Output Summary (Last 24 hours) at 10/23/2023 0733  Last data filed at 10/22/2023 1804  Gross per 24 hour   Intake 1364.73 ml   Output --   Net 1364.73 ml        Ortho/SPM Exam     Gen:  No acute distress, well-developed, well nourished.  CV:  Peripherally well-perfused. 2+ radial pulses, symmetric.  Respiratory:  Normal respiratory effort. No accessory muscle use.   Head/Neck:  Normocephalic.  Atraumatic. Sclera anicteric. TM. Neck supple.  Neuro: No FND. Awake. Alert. Oriented to person, place, time, and situation.   Abdomen: Soft, NTND.      MSK:  L Upper Extremity  Inspection  - Superficial abrasion over the left shoulder with mild swelling  - No ecchymosis, erythema, or signs of cellulitis  Palpation  - TTP over the left shoulder anteriorly, laterally, and posteriorly  Range of motion  - AROM and PROM of the shoulder, elbow, wrist, and hand intact  Stability  - No evidence of joint dislocation or abnormal laxity  Neurovascular  - AIN/PIN/Radial/Median/Ulnar Nerves assessed in isolation without deficit  - Able to give thumbs up, make "OK" sign, cross IF/LF, abduct/adduct fingers, make fist  - SILT throughout  - Compartments soft  - Radial artery palpated  - Capillary Refill <3s  - Muscle tone normal    Right Upper Extremity  Inspection  - Superficial abrasion over left thumb with mild swelling  - No ecchymosis, erythema, or signs of cellulitis  Palpation  - TTP over the thumb, nontender over the wrist, forearm, elbow, humerus, and shoulder  Range of motion  - AROM and PROM of the shoulder, elbow, wrist, and hand intact  Stability  - No evidence of joint dislocation or abnormal laxity   Neurovascular  - AIN/PIN/Radial/Median/Ulnar Nerves assessed in isolation without deficit  - Able to give thumbs up, make "OK" sign, cross IF/LF, " abduct/adduct fingers, make fist  - SILT throughout  - Compartments soft  - Radial artery palpated  - Capillary Refill <3s  - Muscle tone normal      L Lower Extremity  Inspection  - Abrasion to the right knee, nontender, no significant swelling  - No ecchymosis, erythema, or signs of cellulitis  Palpation  - NonTTP throughout, no palpable abnormality   Range of motion  - AROM and PROM of the hip, knee, ankle, and foot intact  Stability  - No evidence of joint dislocation or abnormal laxity  Neurovascular  - TA/EHL/Gastroc/FHL assessed in isolation without deficit  - SILT throughout  - Compartments soft  - DP palpated   - Capillary Refill <3s  - Negative Log roll  - Negative Stinchfield  - Muscle tone normal    R Lower Extremity  Inspection  - Skin intact throughout, no open wounds  - No swelling  - No ecchymosis, erythema, or signs of cellulitis  Palpation  - NonTTP throughout, no palpable abnormality\  Range of motion  - AROM and PROM of the hip, knee, ankle, and foot intact  Stability  - No evidence of joint dislocation or abnormal laxity  Neurovascular  - TA/EHL/Gastroc/FHL assessed in isolation without deficit  - SILT throughout  - Compartments soft  - DP palpated   - Capillary Refill <3s  - Negative Log roll  - Negative Stinchfield  - Muscle tone normal    Spine/pelvis/axial body:  No tenderness to palpation of cervical, thoracic, or lumbar spine  No pain with compression of pelvis  No chest wall or abdominal tenderness  No decubitus ulcers  Muscle tone normal      Significant Labs: CBC:   Recent Labs   Lab 10/22/23  0417 10/23/23  0548   WBC 15.86* 8.05   HGB 14.7 15.6   HCT 43.5 46.0    178     CMP:   Recent Labs   Lab 10/22/23  0417 10/23/23  0548    138   K 3.7 4.2    103   CO2 19* 25   GLU 97 106   BUN 11 9   CREATININE 0.8 0.8   CALCIUM 8.8 9.2   PROT 7.7  --    ALBUMIN 4.7  --    BILITOT 0.4  --    ALKPHOS 50*  --    AST 50*  --    ALT 36  --    ANIONGAP 17* 10     All pertinent labs  within the past 24 hours have been reviewed.    Significant Imaging: CT: I have reviewed all pertinent results/findings and my personal findings are:  CT of the left shoulder reveals distal third fracture of the clavicle with minimal displacement, inferior body scapula fracture  X-Ray: I have reviewed all pertinent results/findings and my personal findings are:  XR left shoulder reveals distal third clavicle fracture.  I have reviewed and interpreted all pertinent imaging results/findings.

## 2023-10-23 NOTE — PLAN OF CARE
[facility] - [facility]  Discharge Reassessment    Primary Care Provider: No, Primary Doctor    Expected Discharge Date: 10/23/2023    Reassessment (most recent)       Discharge Reassessment - 10/23/23 0952          Discharge Reassessment    Assessment Type Discharge Planning Reassessment     Did the patient's condition or plan change since previous assessment? No     Discharge Plan discussed with: Patient     Communicated JEFFY with patient/caregiver Yes     Discharge Plan A Home with family     Discharge Plan B Home with family                   Patient anticipated to d/c home later today per Team. Will have help at home from family.    Jessica Saldana RNCM  Case Management  [facility]  [phone]
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
## 2023-10-25 ENCOUNTER — OFFICE VISIT (OUTPATIENT)
Dept: URGENT CARE | Facility: CLINIC | Age: 38
End: 2023-10-25
Payer: MEDICAID

## 2023-10-25 VITALS
HEART RATE: 99 BPM | HEIGHT: 71 IN | OXYGEN SATURATION: 95 % | DIASTOLIC BLOOD PRESSURE: 87 MMHG | TEMPERATURE: 99 F | WEIGHT: 191.81 LBS | SYSTOLIC BLOOD PRESSURE: 132 MMHG | RESPIRATION RATE: 14 BRPM | BODY MASS INDEX: 26.85 KG/M2

## 2023-10-25 DIAGNOSIS — Z76.89 ENCOUNTER TO ESTABLISH CARE: ICD-10-CM

## 2023-10-25 DIAGNOSIS — T07.XXXA ABRASIONS OF MULTIPLE SITES: ICD-10-CM

## 2023-10-25 DIAGNOSIS — S42.002D CLOSED NONDISPLACED FRACTURE OF LEFT CLAVICLE WITH ROUTINE HEALING, UNSPECIFIED PART OF CLAVICLE, SUBSEQUENT ENCOUNTER: Primary | ICD-10-CM

## 2023-10-25 PROCEDURE — 99203 OFFICE O/P NEW LOW 30 MIN: CPT | Mod: S$GLB,,, | Performed by: FAMILY MEDICINE

## 2023-10-25 PROCEDURE — 99203 PR OFFICE/OUTPT VISIT, NEW, LEVL III, 30-44 MIN: ICD-10-PCS | Mod: S$GLB,,, | Performed by: FAMILY MEDICINE

## 2023-10-25 RX ORDER — LIDOCAINE 50 MG/G
2 PATCH TOPICAL DAILY
Qty: 14 PATCH | Refills: 0 | Status: SHIPPED | OUTPATIENT
Start: 2023-10-25 | End: 2023-11-01

## 2023-10-25 RX ORDER — MUPIROCIN 20 MG/G
OINTMENT TOPICAL 3 TIMES DAILY
Qty: 22 G | Refills: 0 | Status: SHIPPED | OUTPATIENT
Start: 2023-10-25 | End: 2023-11-01

## 2023-10-25 RX ORDER — IBUPROFEN 800 MG/1
800 TABLET ORAL 3 TIMES DAILY
Qty: 30 TABLET | Refills: 0 | Status: SHIPPED | OUTPATIENT
Start: 2023-10-25 | End: 2023-11-04

## 2023-10-25 RX ORDER — METHOCARBAMOL 750 MG/1
750 TABLET, FILM COATED ORAL NIGHTLY
Qty: 5 TABLET | Refills: 0 | Status: SHIPPED | OUTPATIENT
Start: 2023-10-25 | End: 2023-10-30
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
## 2023-10-25 NOTE — PATIENT INSTRUCTIONS
General Discharge Instructions   PLEASE READ YOUR DISCHARGE INSTRUCTIONS ENTIRELY AS IT CONTAINS IMPORTANT INFORMATION.  If you were prescribed a narcotic or controlled medication, do not drive or operate heavy equipment or machinery while taking these medications.  If you were prescribed antibiotics, please take them to completion.  You must understand that you've received an Urgent Care treatment only and that you may be released before all your medical problems are known or treated. You, the patient, will arrange for follow up care as instructed.    OVER THE COUNTER RECOMMENDATIONS/SUGGESTIONS.    Make sure to stay well hydrated.    Use Nasal Saline to mechanically move any post nasal drip from your eustachian tube or from the back of your throat.    Use warm salt water gargles to ease your throat pain. Warm salt water gargles as needed for sore throat- 1/2 tsp salt to 1 cup warm water, gargle as desired.    Use an antihistamine such as Claritin, Zyrtec or Allegra to dry you out.    Use pseudoephedrine (behind the counter) to decongest. Pseudoephedrine 30 mg up to 240 mg /day. It can raise your blood pressure and give you palpitations.    Use mucinex (guaifenesin) to break up mucous up to 2400mg/day to loosen any mucous.    The mucinex DM pill has a cough suppressant that can be sedating. It can be used at night to stop the tickle at the back of your throat.    You can use Mucinex D (it has guaifenesin and a high dose of pseudoephedrine) in the mornings to help decongest.    Use Afrin in each nare for no longer than 3 days, as it is addictive. It can also dry out your mucous membranes and cause elevated blood pressure. This is especially useful if you are flying.    Use Flonase 1-2 sprays/nostril per day. It is a local acting steroid nasal spray, if you develop a bloody nose, stop using the medication immediately.    Sometimes Nyquil at night is beneficial to help you get some rest, however it is sedating and it  does have an antihistamine, and tylenol.    Honey is a natural cough suppressant that can be used.    Tylenol up to 4,000 mg a day is safe for short periods and can be used for body aches, pain, and fever. However in high doses and prolonged use it can cause liver irritation.    Ibuprofen is a non-steroidal anti-inflammatory that can be used for body aches, pain, and fever.However it can also cause stomach irritation if over used.     Follow up with your PCP or specialty clinic as instructed in the next 2-3 days if not improved or as needed. You can call [phone] to schedule an appointment with appropriate provider.      If you condition worsens, we recommend that you receive another evaluation at the emergency room immediately or contact your primary medical clinic's after hours call service to discuss your concerns.      Please return here or go to the Emergency Department for any concerns or worsening condition.   You can also call [phone] to schedule an appointment with the appropriate provider.    Please return here or go to the Emergency Department for any concerns or worsening of condition.    Thank you for choosing [facility]!    Our goal in the Urgent Care is to always provide outstanding medical care. You may receive a survey by mail or e-mail in the next week regarding your experience today. We would greatly appreciate you completing and returning the survey. Your feedback provides us with a way to recognize our staff who provide very good care, and it helps us learn how to improve when your experience was below our aspiration of excellence.      We appreciate you trusting us with your medical care. We hope you feel better soon. We will be happy to take care of you for all of your future medical needs.    Sincerely,    JOSE Simon      Access Navigator team who handles Medicaid referrals INTO [facility]. The main line for that team (for your referrals into [facility]) is  [phone] Access Line - helps Medicaid patients to find Medicaid care OUTSIDE of [facility]. Medicaid Access Line contact is: [phone]  [email or web address] [email or web address] [email or web address] - [phone] - a community line that can help refer

## 2023-10-30 ENCOUNTER — HOSPITAL ENCOUNTER (OUTPATIENT)
Dept: RADIOLOGY | Facility: HOSPITAL | Age: 38
Discharge: HOME | End: 2023-10-30
Attending: EMERGENCY MEDICINE
Payer: MEDICAID

## 2023-10-30 DIAGNOSIS — R06.02 SOB (SHORTNESS OF BREATH): ICD-10-CM

## 2023-10-30 PROCEDURE — 71045 XR CHEST AP PORTABLE: ICD-10-PCS | Mod: 26,,, | Performed by: RADIOLOGY

## 2023-10-30 PROCEDURE — 71045 X-RAY EXAM CHEST 1 VIEW: CPT | Mod: 26,,, | Performed by: RADIOLOGY

## 2023-10-30 PROCEDURE — 71045 X-RAY EXAM CHEST 1 VIEW: CPT | Mod: TC

## 2023-11-01 PROBLEM — R06.02 SOB (SHORTNESS OF BREATH): Status: ACTIVE | Noted: 2023-11-01

## 2023-11-01 PROBLEM — T14.90XA TRAUMA: Status: ACTIVE | Noted: 2023-11-01

## 2023-11-01 PROBLEM — J93.9 PNEUMOTHORAX ON LEFT: Status: ACTIVE | Noted: 2023-11-01

## 2023-11-01 PROBLEM — S42.032A TRAUMATIC CLOSED FRACTURE OF DISTAL CLAVICLE WITH MINIMAL DISPLACEMENT, LEFT, INITIAL ENCOUNTER: Status: ACTIVE | Noted: 2023-11-01

## 2023-11-01 PROBLEM — M25.512 ACUTE PAIN OF LEFT SHOULDER: Status: ACTIVE | Noted: 2023-11-01

## 2023-11-03 ENCOUNTER — TELEPHONE (OUTPATIENT)
Dept: ORTHOPEDICS | Facility: CLINIC | Age: 38
End: 2023-11-03